# Patient Record
Sex: MALE | Race: WHITE | NOT HISPANIC OR LATINO | Employment: OTHER | URBAN - METROPOLITAN AREA
[De-identification: names, ages, dates, MRNs, and addresses within clinical notes are randomized per-mention and may not be internally consistent; named-entity substitution may affect disease eponyms.]

---

## 2017-06-19 PROBLEM — M54.50 LUMBAGO: Status: ACTIVE | Noted: 2017-06-19

## 2017-08-24 ENCOUNTER — OFFICE VISIT (OUTPATIENT)
Dept: SURGERY | Facility: CLINIC | Age: 37
End: 2017-08-24
Payer: MEDICARE

## 2017-08-24 VITALS
HEART RATE: 76 BPM | WEIGHT: 167.13 LBS | SYSTOLIC BLOOD PRESSURE: 131 MMHG | BODY MASS INDEX: 27.81 KG/M2 | DIASTOLIC BLOOD PRESSURE: 87 MMHG | TEMPERATURE: 98 F

## 2017-08-24 DIAGNOSIS — K64.1 GRADE II HEMORRHOIDS: Primary | ICD-10-CM

## 2017-08-24 PROCEDURE — 3008F BODY MASS INDEX DOCD: CPT | Mod: S$GLB,,, | Performed by: SURGERY

## 2017-08-24 PROCEDURE — 99999 PR PBB SHADOW E&M-NEW PATIENT-LVL III: CPT | Mod: PBBFAC,,, | Performed by: SURGERY

## 2017-08-24 PROCEDURE — 99203 OFFICE O/P NEW LOW 30 MIN: CPT | Mod: S$GLB,,, | Performed by: SURGERY

## 2017-08-24 RX ORDER — OMEPRAZOLE 20 MG/1
CAPSULE, DELAYED RELEASE ORAL
COMMUNITY
Start: 2017-08-21 | End: 2019-03-04 | Stop reason: ALTCHOICE

## 2017-08-24 NOTE — PROGRESS NOTES
Subjective:       Patient ID: Darryn Wilson is a 37 y.o. male.    Chief Complaint: Consult (hemorrhiods)    HPI 37-year-old male complains of problems with intermittent hemorrhoids for little over a year.  He says he has internal and external hemorrhoids.  He has pain with most bowel movements.  He has occasional bleeding.  He says he takes pain medicine for this.  He does take a stool softener occasionally.  The bleeding is really minimal.    Review of Systems   Constitutional: Negative for chills, fatigue, fever and unexpected weight change.   HENT: Negative for congestion, sore throat, trouble swallowing and voice change.    Eyes: Negative for redness and visual disturbance.   Respiratory: Negative for cough, shortness of breath and wheezing.    Cardiovascular: Negative for chest pain and palpitations.   Gastrointestinal: Positive for anal bleeding and rectal pain. Negative for abdominal pain, blood in stool, nausea and vomiting.   Genitourinary: Negative for dysuria, frequency, hematuria and urgency.   Musculoskeletal: Negative for arthralgias, myalgias and neck pain.   Skin: Negative for rash and wound.   Allergic/Immunologic: Negative.    Neurological: Negative for tremors, seizures, weakness and headaches.   Hematological: Does not bruise/bleed easily.   Psychiatric/Behavioral: Negative for confusion and dysphoric mood. The patient is not nervous/anxious.      Objective:     Physical Exam   Constitutional: He is oriented to person, place, and time. He appears well-developed and well-nourished. No distress.   HENT:   Head: Normocephalic and atraumatic.   Mouth/Throat: Oropharynx is clear and moist. No oropharyngeal exudate.   Eyes: Conjunctivae and EOM are normal. Pupils are equal, round, and reactive to light. No scleral icterus.   Neck: Normal range of motion. Neck supple. No thyromegaly present.   Cardiovascular: Normal rate, regular rhythm, normal heart sounds and intact distal pulses.    No murmur  heard.  Pulmonary/Chest: Effort normal and breath sounds normal. No respiratory distress. He has no wheezes. He has no rales.   Abdominal: Soft. Bowel sounds are normal. He exhibits no distension. There is no tenderness. No hernia.   Genitourinary:   Genitourinary Comments: He has some small external skin tags.  There are no thrombosed external hemorrhoids.  He has internal hemorrhoids.  There is no evidence of fissure.  There are no anal masses.  There is no bleeding currently.   Musculoskeletal: Normal range of motion. He exhibits no edema or tenderness.   Lymphadenopathy:     He has no cervical adenopathy.   Neurological: He is alert and oriented to person, place, and time. No cranial nerve deficit.   Skin: Skin is warm and dry. No rash noted. No erythema.   Psychiatric: He has a normal mood and affect. His behavior is normal. Judgment normal.     Assessment:     Encounter Diagnosis   Name Primary?    Grade II hemorrhoids Yes       Plan:      1.  Try stool softeners and Proctofoam and sitz baths.  2.  If this problem persists, we may proceed with a PPH hemorrhoidectomy.

## 2018-06-28 ENCOUNTER — TELEPHONE (OUTPATIENT)
Dept: ENDOCRINOLOGY | Facility: CLINIC | Age: 38
End: 2018-06-28

## 2018-07-30 ENCOUNTER — OFFICE VISIT (OUTPATIENT)
Dept: ENDOCRINOLOGY | Facility: CLINIC | Age: 38
End: 2018-07-30
Payer: MEDICARE

## 2018-07-30 ENCOUNTER — TELEPHONE (OUTPATIENT)
Dept: ENDOCRINOLOGY | Facility: CLINIC | Age: 38
End: 2018-07-30

## 2018-07-30 ENCOUNTER — LAB VISIT (OUTPATIENT)
Dept: LAB | Facility: HOSPITAL | Age: 38
End: 2018-07-30
Attending: PHYSICIAN ASSISTANT
Payer: MEDICARE

## 2018-07-30 VITALS
WEIGHT: 171.5 LBS | HEIGHT: 65 IN | DIASTOLIC BLOOD PRESSURE: 84 MMHG | SYSTOLIC BLOOD PRESSURE: 126 MMHG | HEART RATE: 69 BPM | BODY MASS INDEX: 28.57 KG/M2

## 2018-07-30 DIAGNOSIS — E29.1 HYPOGONADISM IN MALE: ICD-10-CM

## 2018-07-30 DIAGNOSIS — E29.1 HYPOGONADISM IN MALE: Primary | ICD-10-CM

## 2018-07-30 PROCEDURE — 36415 COLL VENOUS BLD VENIPUNCTURE: CPT | Mod: PO

## 2018-07-30 PROCEDURE — 84270 ASSAY OF SEX HORMONE GLOBUL: CPT

## 2018-07-30 PROCEDURE — 3008F BODY MASS INDEX DOCD: CPT | Mod: CPTII,S$GLB,, | Performed by: PHYSICIAN ASSISTANT

## 2018-07-30 PROCEDURE — 99203 OFFICE O/P NEW LOW 30 MIN: CPT | Mod: S$GLB,,, | Performed by: PHYSICIAN ASSISTANT

## 2018-07-30 PROCEDURE — 99999 PR PBB SHADOW E&M-EST. PATIENT-LVL III: CPT | Mod: PBBFAC,,, | Performed by: PHYSICIAN ASSISTANT

## 2018-07-30 RX ORDER — MORPHINE SULFATE 200 MG/1
200 TABLET, FILM COATED, EXTENDED RELEASE ORAL 2 TIMES DAILY
COMMUNITY

## 2018-07-30 NOTE — LETTER
July 30, 2018      Maxim Storm III, MD  [address]  [place] [address]           [place] - Endo/Diabetes  [address]  [place] [address]  Phone: [phone]          Patient: Darryn Wilson   MR Number: [id number]   YOB: 1980   Date of Visit: 7/30/2018       Dear Dr. Maxim Storm III:    Thank you for referring Darryn Wilson to me for evaluation. Attached you will find relevant portions of my assessment and plan of care.    If you have questions, please do not hesitate to call me. I look forward to following Darryn Wilson along with you.    Sincerely,    CHAD Garcia PA-C    Enclosure  CC:  No Recipients    If you would like to receive this communication electronically, please contact [email or web address] or [phone] to request more information on [employer] Link access.    For providers and/or their staff who would like to refer a patient to [facility], please contact us through our one-stop-shop provider referral line, [facility], at [phone].    If you feel you have received this communication in error or would no longer like to receive these types of communications, please e-mail [email or web address]

## 2018-07-30 NOTE — TELEPHONE ENCOUNTER
Mr. Wilson needs to do a semen anaylsis with his labs in 2 months and have an appointment with Dr. Verdin in 3 months.

## 2018-08-01 ENCOUNTER — TELEPHONE (OUTPATIENT)
Dept: FAMILY MEDICINE | Facility: CLINIC | Age: 38
End: 2018-08-01

## 2018-08-01 NOTE — TELEPHONE ENCOUNTER
Attempted to call Pt regarding Semen Analysis along with Labs in 2 month also to confirm his F/u appt with Dr. Verdin, no answer left call back message

## 2018-08-01 NOTE — TELEPHONE ENCOUNTER
Called patient and left a message to call back to the clinic regarding instructions from Jose PERRY.

## 2018-08-01 NOTE — TELEPHONE ENCOUNTER
Attempted to call Pt regarding Semen Analysis along with Labs in 2 month also to confirm his F/u appt with Dr. Verdin

## 2018-08-02 ENCOUNTER — TELEPHONE (OUTPATIENT)
Dept: FAMILY MEDICINE | Facility: CLINIC | Age: 38
End: 2018-08-02

## 2018-08-03 LAB
ALBUMIN SERPL-MCNC: 4.3 G/DL (ref 3.6–5.1)
SHBG SERPL-SCNC: 71 NMOL/L (ref 10–50)
TESTOST FREE SERPL-MCNC: 53.9 PG/ML (ref 46–224)
TESTOST SERPL-MCNC: 751 NG/DL (ref 250–1100)
TESTOSTERONE.FREE+WB SERPL-MCNC: 106.1 NG/DL (ref 110–575)

## 2018-08-07 ENCOUNTER — TELEPHONE (OUTPATIENT)
Dept: ENDOCRINOLOGY | Facility: CLINIC | Age: 38
End: 2018-08-07

## 2018-08-07 NOTE — TELEPHONE ENCOUNTER
Attempted to call Pt in regards instructions from Precious Garcia, no answer left call back message

## 2018-09-26 ENCOUNTER — LAB VISIT (OUTPATIENT)
Dept: LAB | Facility: HOSPITAL | Age: 38
End: 2018-09-26
Attending: PHYSICIAN ASSISTANT
Payer: MEDICARE

## 2018-09-26 DIAGNOSIS — E29.1 HYPOGONADISM IN MALE: ICD-10-CM

## 2018-09-26 LAB
ALBUMIN SERPL BCP-MCNC: 4.2 G/DL
ALP SERPL-CCNC: 44 U/L
ALT SERPL W/O P-5'-P-CCNC: 13 U/L
ANION GAP SERPL CALC-SCNC: 7 MMOL/L
AST SERPL-CCNC: 17 U/L
BASOPHILS # BLD AUTO: 0.06 K/UL
BASOPHILS NFR BLD: 1.3 %
BILIRUB SERPL-MCNC: 0.3 MG/DL
BUN SERPL-MCNC: 9 MG/DL
CALCIUM SERPL-MCNC: 9.4 MG/DL
CHLORIDE SERPL-SCNC: 103 MMOL/L
CO2 SERPL-SCNC: 31 MMOL/L
CREAT SERPL-MCNC: 0.9 MG/DL
DIFFERENTIAL METHOD: ABNORMAL
EOSINOPHIL # BLD AUTO: 0.1 K/UL
EOSINOPHIL NFR BLD: 2.7 %
ERYTHROCYTE [DISTWIDTH] IN BLOOD BY AUTOMATED COUNT: 11.8 %
EST. GFR  (AFRICAN AMERICAN): >60 ML/MIN/1.73 M^2
EST. GFR  (NON AFRICAN AMERICAN): >60 ML/MIN/1.73 M^2
FSH SERPL-ACNC: 1.5 MIU/ML
GLUCOSE SERPL-MCNC: 95 MG/DL
HCT VFR BLD AUTO: 38 %
HGB BLD-MCNC: 13 G/DL
IMM GRANULOCYTES # BLD AUTO: 0 K/UL
IMM GRANULOCYTES NFR BLD AUTO: 0 %
LH SERPL-ACNC: 1.7 MIU/ML
LYMPHOCYTES # BLD AUTO: 2.6 K/UL
LYMPHOCYTES NFR BLD: 55.6 %
MCH RBC QN AUTO: 28.4 PG
MCHC RBC AUTO-ENTMCNC: 34.2 G/DL
MCV RBC AUTO: 83 FL
MONOCYTES # BLD AUTO: 0.3 K/UL
MONOCYTES NFR BLD: 5.9 %
NEUTROPHILS # BLD AUTO: 1.6 K/UL
NEUTROPHILS NFR BLD: 34.5 %
NRBC BLD-RTO: 0 /100 WBC
PLATELET # BLD AUTO: 268 K/UL
PMV BLD AUTO: 9.7 FL
POTASSIUM SERPL-SCNC: 4.2 MMOL/L
PROLACTIN SERPL IA-MCNC: 7.7 NG/ML
PROT SERPL-MCNC: 6.6 G/DL
RBC # BLD AUTO: 4.57 M/UL
SODIUM SERPL-SCNC: 141 MMOL/L
WBC # BLD AUTO: 4.75 K/UL

## 2018-09-26 PROCEDURE — 80053 COMPREHEN METABOLIC PANEL: CPT

## 2018-09-26 PROCEDURE — 83001 ASSAY OF GONADOTROPIN (FSH): CPT

## 2018-09-26 PROCEDURE — 83002 ASSAY OF GONADOTROPIN (LH): CPT

## 2018-09-26 PROCEDURE — 84146 ASSAY OF PROLACTIN: CPT

## 2018-09-26 PROCEDURE — 36415 COLL VENOUS BLD VENIPUNCTURE: CPT | Mod: PO

## 2018-09-26 PROCEDURE — 85025 COMPLETE CBC W/AUTO DIFF WBC: CPT

## 2018-09-26 PROCEDURE — 84270 ASSAY OF SEX HORMONE GLOBUL: CPT

## 2018-09-29 LAB
ALBUMIN SERPL-MCNC: 4.5 G/DL (ref 3.6–5.1)
SHBG SERPL-SCNC: 116 NMOL/L (ref 10–50)
TESTOST FREE SERPL-MCNC: 9.3 PG/ML (ref 46–224)
TESTOST SERPL-MCNC: 229 NG/DL (ref 250–1100)
TESTOSTERONE.FREE+WB SERPL-MCNC: 19.2 NG/DL (ref 110–575)

## 2018-10-01 ENCOUNTER — OFFICE VISIT (OUTPATIENT)
Dept: ENDOCRINOLOGY | Facility: CLINIC | Age: 38
End: 2018-10-01
Payer: MEDICARE

## 2018-10-01 VITALS
SYSTOLIC BLOOD PRESSURE: 113 MMHG | RESPIRATION RATE: 16 BRPM | HEART RATE: 53 BPM | BODY MASS INDEX: 26.38 KG/M2 | HEIGHT: 65 IN | TEMPERATURE: 98 F | DIASTOLIC BLOOD PRESSURE: 73 MMHG | WEIGHT: 158.31 LBS

## 2018-10-01 DIAGNOSIS — E29.1 HYPOGONADISM IN MALE: Primary | ICD-10-CM

## 2018-10-01 PROCEDURE — 3008F BODY MASS INDEX DOCD: CPT | Mod: CPTII,,, | Performed by: PHYSICIAN ASSISTANT

## 2018-10-01 PROCEDURE — 99999 PR PBB SHADOW E&M-EST. PATIENT-LVL IV: CPT | Mod: PBBFAC,,, | Performed by: PHYSICIAN ASSISTANT

## 2018-10-01 PROCEDURE — 99214 OFFICE O/P EST MOD 30 MIN: CPT | Mod: S$PBB,,, | Performed by: PHYSICIAN ASSISTANT

## 2018-10-01 PROCEDURE — 99214 OFFICE O/P EST MOD 30 MIN: CPT | Mod: PBBFAC,PO | Performed by: PHYSICIAN ASSISTANT

## 2018-10-01 NOTE — PROGRESS NOTES
"CC: Hypogonadism    HPI: Darryn Wilson is a 38 y.o. male here for hypogonadism along with other conditions listed in the Visit Diagnosis. Diagnosed ~ 4 years ago after being on pain medication for a back injury. He is using 200 mg q 4 weeks of testosterone cypionate. He is receiving his testosterone from pain management. He has not had an MRI or scrotal u/s. +FHx of DM in his paternal aunt and grandmother. No fhx of thyroid disease. His last injection was July 20th. No hx of brain or testicular injury. No history of body building supplements. He does take pain medication regularly for a back injury.     He is trying to conceive. His wife has been checked by her GYN. He does have one 14 year old daughter. He did take clomid in the past and stopped when his insurance would not cover the medication. Sex drive is low, no morning erections. Body hair has decreased. Fatigue. He has not yet had an MRI or sperm analysis. Reports having sperm analysis about two years ago when he was on testosterone and reportedly this was low. Last testosterone was 229, free (9.3), Bioavaliable (19.2), this was ~8 weeks off testosterone injections.    PMHx, PSHx: reviewed in epic.    Social Hx: no ETOH/tobacco use. He did smoke from the age of [age] yo and smoked 1 pack per day.    ROS:   Constitutional: fatigue, wt loss.   Eyes: No recent visual changes  Cardiovascular: Denies current anginal symptoms  Respiratory: Denies current respiratory difficulty  Gastrointestinal: Denies recent bowel disturbances  GenitoUrinary - No dysuria  Skin: No new skin rash  Neurologic: No focal neurologic complaints  Musc: no muscle aches or joint pain  Remainder ROS negative     /73 (BP Location: Left arm, Patient Position: Sitting, BP Method: Large (Automatic))   Pulse (!) 53   Temp 97.9 °F (36.6 °C) (Oral)   Resp 16   Ht 5' 5" (1.651 m)   Wt 71.8 kg (158 lb 4.6 oz)   BMI 26.34 kg/m²      Personally reviewed labs below:    Lab Visit on " 09/26/2018   Component Date Value Ref Range Status    Testosterone 09/26/2018 229* 250 - 1100 ng/dL Final    Testosterone, Free 09/26/2018 9.3* 46.0 - 224.0 pg/mL Final    Testosterone, Bioavailable 09/26/2018 19.2* 110.0 - 575.0 ng/dL Final    Sex Hormone Binding Globulin 09/26/2018 116* 10 - 50 nmol/L Final    Albumin 09/26/2018 4.5  3.6 - 5.1 g/dL Final    Comment: **Data from J Clin Invest 1974:53:819-828 and J Clin Endocrinol   Metab 1973 36:7937-4042. Men with clinically significant   hypogonadal symptoms and testosterone values repeatedly in the   range of the 200-300 ng/dL or less, may benefit from testosterone  treatment after adequate risk and benefits counseling.  For additional information, please refer to   http://education.[employer]/faq/ELU153 (This link is   being provided for informational/ educational purposes only.)  This test was developed and its analytical performance   characteristics have been determined by [facility]. It has not been cleared or approved by the [place]  Food and Drug Administration. This assay has been validated   pursuant to the CLIA regulations and is used for clinical   purposes.  @ Test Performed By:  [facility]  Bassam Arredondo M.D., Ph.D.,   [address] [place] [id number]  CLIA  [id number]      WBC 09/26/2018 4.75  3.90 - 12.70 K/uL Final    RBC 09/26/2018 4.57* 4.60 - 6.20 M/uL Final    Hemoglobin 09/26/2018 13.0* 14.0 - 18.0 g/dL Final    Hematocrit 09/26/2018 38.0* 40.0 - 54.0 % Final    MCV 09/26/2018 83  82 - 98 fL Final    MCH 09/26/2018 28.4  27.0 - 31.0 pg Final    MCHC 09/26/2018 34.2  32.0 - 36.0 g/dL Final    RDW 09/26/2018 11.8  11.5 - 14.5 % Final    Platelets 09/26/2018 268  150 - 350 K/uL Final    MPV 09/26/2018 9.7  9.2 - 12.9 fL Final    Immature Granulocytes 09/26/2018 0.0  0.0 - 0.5 % Final    Gran # (ANC) 09/26/2018 1.6* 1.8 - 7.7 K/uL  Final    Immature Grans (Abs) 09/26/2018 0.00  0.00 - 0.04 K/uL Final    Comment: Mild elevation in immature granulocytes is non specific and   can be seen in a variety of conditions including stress response,   acute inflammation, trauma and pregnancy. Correlation with other   laboratory and clinical findings is essential.      Lymph # 09/26/2018 2.6  1.0 - 4.8 K/uL Final    Mono # 09/26/2018 0.3  0.3 - 1.0 K/uL Final    Eos # 09/26/2018 0.1  0.0 - 0.5 K/uL Final    Baso # 09/26/2018 0.06  0.00 - 0.20 K/uL Final    nRBC 09/26/2018 0  0 /100 WBC Final    Gran% 09/26/2018 34.5* 38.0 - 73.0 % Final    Lymph% 09/26/2018 55.6* 18.0 - 48.0 % Final    Mono% 09/26/2018 5.9  4.0 - 15.0 % Final    Eosinophil% 09/26/2018 2.7  0.0 - 8.0 % Final    Basophil% 09/26/2018 1.3  0.0 - 1.9 % Final    Differential Method 09/26/2018 Automated   Final    Sodium 09/26/2018 141  136 - 145 mmol/L Final    Potassium 09/26/2018 4.2  3.5 - 5.1 mmol/L Final    Chloride 09/26/2018 103  95 - 110 mmol/L Final    CO2 09/26/2018 31* 23 - 29 mmol/L Final    Glucose 09/26/2018 95  70 - 110 mg/dL Final    BUN, Bld 09/26/2018 9  6 - 20 mg/dL Final    Creatinine 09/26/2018 0.9  0.5 - 1.4 mg/dL Final    Calcium 09/26/2018 9.4  8.7 - 10.5 mg/dL Final    Total Protein 09/26/2018 6.6  6.0 - 8.4 g/dL Final    Albumin 09/26/2018 4.2  3.5 - 5.2 g/dL Final    Total Bilirubin 09/26/2018 0.3  0.1 - 1.0 mg/dL Final    Comment: For infants and newborns, interpretation of results should be based  on gestational age, weight and in agreement with clinical  observations.  Premature Infant recommended reference ranges:  Up to 24 hours.............<8.0 mg/dL  Up to 48 hours............<12.0 mg/dL  3-5 days..................<15.0 mg/dL  6-29 days.................<15.0 mg/dL      Alkaline Phosphatase 09/26/2018 44* 55 - 135 U/L Final    AST 09/26/2018 17  10 - 40 U/L Final    ALT 09/26/2018 13  10 - 44 U/L Final    Anion Gap 09/26/2018 7* 8 - 16  mmol/L Final    eGFR if African American 09/26/2018 >60.0  >60 mL/min/1.73 m^2 Final    eGFR if non African American 09/26/2018 >60.0  >60 mL/min/1.73 m^2 Final    Comment: Calculation used to obtain the estimated glomerular filtration  rate (eGFR) is the CKD-EPI equation.       FSH 09/26/2018 1.50  0.95 - 11.95 mIU/mL Final    Comment: Female Reference Ranges:  Follicular Phase.................3.03-8.08 mIU/mL  Midcycle Peak....................2.55-16.69 mIU/mL  Luteal Phase.....................1.38-5.47 mIU/mL  Postmenopausal...................26..41 mIU/mL  Male Reference Range:............0.95-11.95 mIU/mL      LH 09/26/2018 1.7  0.6 - 12.1 mIU/mL Final    Comment: Female Reference Ranges:  Follicular phase.............1.8-11.8 mIU/mL  Midcycle phase...............7.6-89.1 mIU/mL  Luteal phase.................0.6-14.0 mIU/mL  Post-menopausal without HRT..5.2-62.0 mIU/mL  Male Reference Interval......0.6-12.1 mIU/mL      Prolactin 09/26/2018 7.7  3.5 - 19.4 ng/mL Final      Prior labs under media tab from April.  No results found for: TSH, R1UXQGJ, R4MFOKB, THYROIDAB, FREET4      No results found for: HGBA1C     Previous exam 7/18  PE:  GENERAL: young male, well developed, well nourished  NECK: Supple neck, normal thyroid. No bruit. No AN.  LYMPHATIC: No cervical or supraclavicular lymphadenopathy  CARDIOVASCULAR: Normal heart sounds, no pedal edema  RESPIRATORY: Normal effort, CTAB.  ABDOMEN: soft, non-tender, non-distended.  : normal penis. Right testicle is larger than the left. No masses.  FEET: appropriate footwear.   PSYCH: normal mood and affect    Assessment/Plan:   1. Hypogonadism in male  MRI Brain W WO Contrast     Hypogonadism-MRI and sperm analysis.  Plan to start clomid 50 mg following sperm analysis. Pt will have to see urology if he does not have any sperm.    F/u 2 mths

## 2018-12-14 ENCOUNTER — LAB VISIT (OUTPATIENT)
Dept: LAB | Facility: HOSPITAL | Age: 38
End: 2018-12-14
Attending: INTERNAL MEDICINE
Payer: MEDICARE

## 2018-12-14 ENCOUNTER — OFFICE VISIT (OUTPATIENT)
Dept: ENDOCRINOLOGY | Facility: CLINIC | Age: 38
End: 2018-12-14
Payer: MEDICARE

## 2018-12-14 VITALS
HEART RATE: 59 BPM | RESPIRATION RATE: 16 BRPM | DIASTOLIC BLOOD PRESSURE: 71 MMHG | SYSTOLIC BLOOD PRESSURE: 112 MMHG | BODY MASS INDEX: 27.58 KG/M2 | TEMPERATURE: 99 F | WEIGHT: 165.56 LBS | HEIGHT: 65 IN

## 2018-12-14 DIAGNOSIS — E29.1 HYPOGONADISM IN MALE: ICD-10-CM

## 2018-12-14 DIAGNOSIS — I10 ESSENTIAL HYPERTENSION: ICD-10-CM

## 2018-12-14 DIAGNOSIS — N40.0 BENIGN PROSTATIC HYPERPLASIA, UNSPECIFIED WHETHER LOWER URINARY TRACT SYMPTOMS PRESENT: ICD-10-CM

## 2018-12-14 DIAGNOSIS — E29.1 HYPOGONADISM IN MALE: Primary | ICD-10-CM

## 2018-12-14 DIAGNOSIS — Z31.69 SUBFERTILITY OF COUPLE: Primary | ICD-10-CM

## 2018-12-14 DIAGNOSIS — F32.A DEPRESSION, UNSPECIFIED DEPRESSION TYPE: ICD-10-CM

## 2018-12-14 DIAGNOSIS — Z31.69 SUBFERTILITY OF COUPLE: ICD-10-CM

## 2018-12-14 DIAGNOSIS — R68.82 REDUCED LIBIDO: ICD-10-CM

## 2018-12-14 DIAGNOSIS — K21.9 GASTROESOPHAGEAL REFLUX DISEASE WITHOUT ESOPHAGITIS: ICD-10-CM

## 2018-12-14 LAB
BASOPHILS # BLD AUTO: 0.07 K/UL
BASOPHILS NFR BLD: 1.2 %
DIFFERENTIAL METHOD: ABNORMAL
EOSINOPHIL # BLD AUTO: 0.1 K/UL
EOSINOPHIL NFR BLD: 2.2 %
ERYTHROCYTE [DISTWIDTH] IN BLOOD BY AUTOMATED COUNT: 11.8 %
ESTRADIOL SERPL-MCNC: 26 PG/ML
FSH SERPL-ACNC: <0.1 MIU/ML
HCT VFR BLD AUTO: 39 %
HGB BLD-MCNC: 13 G/DL
IMM GRANULOCYTES # BLD AUTO: 0.01 K/UL
IMM GRANULOCYTES NFR BLD AUTO: 0.2 %
LH SERPL-ACNC: <0.1 MIU/ML
LYMPHOCYTES # BLD AUTO: 2.8 K/UL
LYMPHOCYTES NFR BLD: 48 %
MCH RBC QN AUTO: 28.3 PG
MCHC RBC AUTO-ENTMCNC: 33.3 G/DL
MCV RBC AUTO: 85 FL
MONOCYTES # BLD AUTO: 0.4 K/UL
MONOCYTES NFR BLD: 7 %
NEUTROPHILS # BLD AUTO: 2.4 K/UL
NEUTROPHILS NFR BLD: 41.4 %
NRBC BLD-RTO: 0 /100 WBC
PLATELET # BLD AUTO: 288 K/UL
PMV BLD AUTO: 9.6 FL
PROSTATE SPECIFIC ANTIGEN, TOTAL: 0.29 NG/ML
PSA FREE MFR SERPL: 79.31 %
PSA FREE SERPL-MCNC: 0.23 NG/ML
RBC # BLD AUTO: 4.6 M/UL
WBC # BLD AUTO: 5.87 K/UL

## 2018-12-14 PROCEDURE — 99999 PR PBB SHADOW E&M-EST. PATIENT-LVL IV: CPT | Mod: PBBFAC,,, | Performed by: INTERNAL MEDICINE

## 2018-12-14 PROCEDURE — 80327 ANABOLIC STEROID 1 OR 2: CPT

## 2018-12-14 PROCEDURE — 3008F BODY MASS INDEX DOCD: CPT | Mod: CPTII,S$GLB,, | Performed by: INTERNAL MEDICINE

## 2018-12-14 PROCEDURE — 84154 ASSAY OF PSA FREE: CPT

## 2018-12-14 PROCEDURE — 82672 ASSAY OF ESTROGEN: CPT

## 2018-12-14 PROCEDURE — 83002 ASSAY OF GONADOTROPIN (LH): CPT

## 2018-12-14 PROCEDURE — 85025 COMPLETE CBC W/AUTO DIFF WBC: CPT

## 2018-12-14 PROCEDURE — 36415 COLL VENOUS BLD VENIPUNCTURE: CPT | Mod: PO

## 2018-12-14 PROCEDURE — 83001 ASSAY OF GONADOTROPIN (FSH): CPT

## 2018-12-14 PROCEDURE — 99214 OFFICE O/P EST MOD 30 MIN: CPT | Mod: S$GLB,,, | Performed by: INTERNAL MEDICINE

## 2018-12-14 PROCEDURE — 82670 ASSAY OF TOTAL ESTRADIOL: CPT

## 2018-12-14 PROCEDURE — 84270 ASSAY OF SEX HORMONE GLOBUL: CPT

## 2018-12-14 RX ORDER — CLOMIPHENE CITRATE 50 MG/1
50 TABLET ORAL DAILY
Qty: 90 TABLET | Refills: 3 | Status: SHIPPED | OUTPATIENT
Start: 2018-12-14 | End: 2019-12-02

## 2018-12-14 RX ORDER — DEXTROAMPHETAMINE SACCHARATE, AMPHETAMINE ASPARTATE MONOHYDRATE, DEXTROAMPHETAMINE SULFATE AND AMPHETAMINE SULFATE 5; 5; 5; 5 MG/1; MG/1; MG/1; MG/1
1 CAPSULE, EXTENDED RELEASE ORAL
COMMUNITY
End: 2019-03-04 | Stop reason: ALTCHOICE

## 2018-12-14 RX ORDER — ONDANSETRON 4 MG/1
TABLET, FILM COATED ORAL
COMMUNITY
Start: 2018-11-29

## 2018-12-14 RX ORDER — TESTOSTERONE CYPIONATE 200 MG/ML
1 INJECTION, SOLUTION INTRAMUSCULAR
COMMUNITY
Start: 2018-12-05 | End: 2019-09-12

## 2018-12-14 NOTE — LETTER
December 14, 2018      Darryn Liu NP  [id number]  [facility]  [place] [address]           [place] - Endo/Diabetes  [address]   [place] [address]  Phone: [phone]          Patient: Darryn Wilson   MR Number: [id number]   YOB: 1980   Date of Visit: 12/14/2018       Dear :    Thank you for referring Darryn Wilson to me for evaluation. Attached you will find relevant portions of my assessment and plan of care.    If you have questions, please do not hesitate to call me. I look forward to following Darryn Wilson along with you.    Sincerely,    Kenyon Verdin MD    Enclosure  CC:  No Recipients    If you would like to receive this communication electronically, please contact [email or web address] or [phone] to request more information on [employer] Link access.    For providers and/or their staff who would like to refer a patient to [facility], please contact us through our one-stop-shop provider referral line, [facility], at [phone].    If you feel you have received this communication in error or would no longer like to receive these types of communications, please e-mail [email or web address]

## 2018-12-14 NOTE — PROGRESS NOTES
Subjective:      Patient ID: Darryn Wilson is a 38 y.o. male.    Chief Complaint:     38 yr old gentleman with hypogonadism and subefertility seen for initial care visit today.    History of Present Illness    The patient  is a 38 y.o. gentleman seen for initial care visit today with me on account of hypogonadism.  Patient had previously been seen by Precious Garcia PA-C..  His associated comorbidities are as detailed below;  Patient Active Problem List   Diagnosis    Lumbago    Hypogonadism in male    Essential hypertension    Gastroesophageal reflux disease without esophagitis    Depression    Reduced libido    Subfertility of couple        He was diagnosed ~ 4 years ago after being on pain medication for a back injury. He is currently using 200 mg q monthly  of testosterone cypionate.   Patient has not yet commenced clomiphene. He is receiving his testosterone from pain management. He has not had an MRI or scrotal u/s. +FHx of DM in his paternal aunt and grandmother. No fhx of thyroid disease. His last injection was July 20th. No hx of brain or testicular injury. No history of body building supplements. He does take pain medication regularly for a back injury. Patient has had multiple back surgeries on this account.  Patient had a back injury during work when he worked for [employer] and [employer]. He is presently on disability as a result.  Patients baseline [place] score is 9. He has not had a prior sleep study done.     He is presently trying to conceive. His wife has been checked by her GYN. He does have one 14 year old daughter. He did take clomid in the past and stopped when his insurance would not cover the medication. Sex drive is low, no morning erections. Body hair has decreased. Fatigue. He has not yet had an MRI or sperm analysis. Reports having sperm analysis about two years ago when he was on testosterone and reportedly this was low. Last testosterone was 229, free (9.3), Bioavaliable (19.2), this  "was ~8 weeks off testosterone injections.  Patient received his last injection ~ 1 week ago.        Review of Systems   Constitutional: Positive for fatigue (chronic). Negative for fever.   HENT: Negative for facial swelling, trouble swallowing and voice change.    Eyes: Negative for visual disturbance.   Respiratory: Negative for cough and shortness of breath.    Cardiovascular: Negative for chest pain, palpitations and leg swelling.   Gastrointestinal: Negative for nausea and vomiting.   Endocrine: Negative for polyuria.   Genitourinary: Negative for dysuria and frequency.   Musculoskeletal: Positive for back pain (chronic but stable), gait problem (spastic shuffling gaite) and neck stiffness (chronic). Negative for myalgias.   Skin: Negative for color change, pallor and rash.   Neurological: Positive for numbness (intermittently in both lower limbs). Negative for tremors, seizures and headaches.   Hematological: Does not bruise/bleed easily.   Psychiatric/Behavioral: Negative for decreased concentration and sleep disturbance.       Objective:   /71 (BP Location: Right arm, Patient Position: Sitting, BP Method: Medium (Automatic))   Pulse (!) 59   Temp 98.5 °F (36.9 °C) (Oral)   Resp 16   Ht 5' 5" (1.651 m)   Wt 75.1 kg (165 lb 9.1 oz)   BMI 27.55 kg/m² neck circ; 14" waist circ; 34.5" hip circ; 36" waist to hip ratio; 0.96       Physical Exam   Constitutional: He is oriented to person, place, and time. He appears well-developed and well-nourished. No distress.   Pleasant young man. Not pale, anicteric and afebrile. Well hydrated. Not in any acute distress.     HENT:   Head: Normocephalic and atraumatic.   Mouth/Throat: No oropharyngeal exudate.   Eyes: Conjunctivae and EOM are normal. Pupils are equal, round, and reactive to light. No scleral icterus.   Neck: Normal range of motion. Neck supple. No JVD present. No tracheal deviation present. No thyromegaly present.   Cardiovascular: Normal rate, " regular rhythm and normal heart sounds.   No murmur heard.  Pulmonary/Chest: Effort normal and breath sounds normal. No stridor. No respiratory distress. He has no wheezes.   Abdominal: Soft. He exhibits no distension. There is no tenderness.   Musculoskeletal: Normal range of motion. He exhibits no edema or tenderness.   Patient has a spastic gaite.  Loss of natural lumbar lordosis.   Lymphadenopathy:     He has no cervical adenopathy.   Neurological: He is alert and oriented to person, place, and time. No cranial nerve deficit. He exhibits abnormal muscle tone (generalized hypertonicity with lower limb hypereflexia).   Skin: Skin is warm and dry. No rash noted. He is not diaphoretic. No erythema. No pallor.   Psychiatric: He has a normal mood and affect. His behavior is normal. Judgment and thought content normal.   Vitals reviewed.      Lab Review:     Results for JAMES DIAZ (MRN [id number]) as of 12/14/2018 09:00   Ref. Range 7/30/2018 11:35 9/26/2018 11:30   WBC Latest Ref Range: 3.90 - 12.70 K/uL  4.75   RBC Latest Ref Range: 4.60 - 6.20 M/uL  4.57 (L)   Hemoglobin Latest Ref Range: 14.0 - 18.0 g/dL  13.0 (L)   Hematocrit Latest Ref Range: 40.0 - 54.0 %  38.0 (L)   MCV Latest Ref Range: 82 - 98 fL  83   MCH Latest Ref Range: 27.0 - 31.0 pg  28.4   MCHC Latest Ref Range: 32.0 - 36.0 g/dL  34.2   RDW Latest Ref Range: 11.5 - 14.5 %  11.8   Platelets Latest Ref Range: 150 - 350 K/uL  268   MPV Latest Ref Range: 9.2 - 12.9 fL  9.7   Gran% Latest Ref Range: 38.0 - 73.0 %  34.5 (L)   Gran # (ANC) Latest Ref Range: 1.8 - 7.7 K/uL  1.6 (L)   Immature Granulocytes Latest Ref Range: 0.0 - 0.5 %  0.0   Immature Grans (Abs) Latest Ref Range: 0.00 - 0.04 K/uL  0.00   Lymph% Latest Ref Range: 18.0 - 48.0 %  55.6 (H)   Lymph # Latest Ref Range: 1.0 - 4.8 K/uL  2.6   Mono% Latest Ref Range: 4.0 - 15.0 %  5.9   Mono # Latest Ref Range: 0.3 - 1.0 K/uL  0.3   Eosinophil% Latest Ref Range: 0.0 - 8.0 %  2.7   Eos # Latest  Ref Range: 0.0 - 0.5 K/uL  0.1   Basophil% Latest Ref Range: 0.0 - 1.9 %  1.3   Baso # Latest Ref Range: 0.00 - 0.20 K/uL  0.06   nRBC Latest Ref Range: 0 /100 WBC  0   Differential Method Unknown  Automated   Sodium Latest Ref Range: 136 - 145 mmol/L  141   Potassium Latest Ref Range: 3.5 - 5.1 mmol/L  4.2   Chloride Latest Ref Range: 95 - 110 mmol/L  103   CO2 Latest Ref Range: 23 - 29 mmol/L  31 (H)   Anion Gap Latest Ref Range: 8 - 16 mmol/L  7 (L)   BUN, Bld Latest Ref Range: 6 - 20 mg/dL  9   Creatinine Latest Ref Range: 0.5 - 1.4 mg/dL  0.9   eGFR if non African American Latest Ref Range: >60 mL/min/1.73 m^2  >60.0   eGFR if African American Latest Ref Range: >60 mL/min/1.73 m^2  >60.0   Glucose Latest Ref Range: 70 - 110 mg/dL  95   Calcium Latest Ref Range: 8.7 - 10.5 mg/dL  9.4   Alkaline Phosphatase Latest Ref Range: 55 - 135 U/L  44 (L)   Total Protein Latest Ref Range: 6.0 - 8.4 g/dL  6.6   Albumin Latest Ref Range: 3.5 - 5.2 g/dL  4.2   Total Bilirubin Latest Ref Range: 0.1 - 1.0 mg/dL  0.3   AST Latest Ref Range: 10 - 40 U/L  17   ALT Latest Ref Range: 10 - 44 U/L  13   Albumin Latest Ref Range: 3.6 - 5.1 g/dL 4.3 4.5   FSH Latest Ref Range: 0.95 - 11.95 mIU/mL  1.50   LH Latest Ref Range: 0.6 - 12.1 mIU/mL  1.7   Prolactin Latest Ref Range: 3.5 - 19.4 ng/mL  7.7   Sex Hormone Binding Globulin Latest Ref Range: 10 - 50 nmol/L 71 (H) 116 (H)   Testosterone Latest Ref Range: 250 - 1100 ng/dL 751 229 (L)   Testosterone Testosterone Latest Ref Range: 110.0 - 575.0 ng/dL 106.1 (L) 19.2 (L)   Testosterone, Free Latest Ref Range: 46.0 - 224.0 pg/mL 53.9 9.3 (L)       Assessment:     1. Hypogonadism in male  Dihydrotestosterone    Follicle stimulating hormone    Luteinizing hormone    Testosterone Panel    Estradiol    Estrogens, total    PSA, total and free    CBC auto differential    Semen analysis   2. Essential hypertension     3. Gastroesophageal reflux disease without esophagitis     4. Depression,  unspecified depression type     5. Reduced libido     6. Subfertility of couple  Semen analysis   7. Benign prostatic hyperplasia, unspecified whether lower urinary tract symptoms present  PSA, total and free        1. Regarding hypogonadism; to obtain full testing profile as detailed above. It appears that the current hypogonadism is likely due to the opiates that he is on. To stop the testosterone immediately and commence clomiphene 50mg QD.  2. Regarding hypertension; BP is well controlled.  3. Regarding GERD; symptomatically stable. To continue PPI.      Plan:       FFup in ~ 3mths

## 2018-12-17 ENCOUNTER — TELEPHONE (OUTPATIENT)
Dept: ENDOCRINOLOGY | Facility: CLINIC | Age: 38
End: 2018-12-17

## 2018-12-17 DIAGNOSIS — E29.1 HYPOGONADISM IN MALE: Primary | ICD-10-CM

## 2018-12-17 NOTE — TELEPHONE ENCOUNTER
----- Message from Vidhya Martinez sent at 12/17/2018  1:59 PM CST -----  Patient is coming in on 12/20/18 for a MRI and requires LABS to be done prior to contrast, please put the STAT LAB orders in for creatinine serum and let me know and I will complete the appointment need for our patient.  Also the MRI edward Mikayla wanted to verify the orders and what you were looking for ,to see if right orders in , the brain or Pituitary study you can call her at [phone] and speak to her to clarify.  Thanking you in advance for you help with this important matter.    Vidhya Martinez    [phone]

## 2018-12-18 LAB
ANDROSTANOLONE SERPL-MCNC: 930 PG/ML (ref 112–955)
ESTROGEN SERPL-MCNC: 94 PG/ML

## 2018-12-19 LAB
ALBUMIN SERPL-MCNC: 4.2 G/DL (ref 3.6–5.1)
SHBG SERPL-SCNC: 103 NMOL/L (ref 10–50)
TESTOST FREE SERPL-MCNC: 57.3 PG/ML (ref 46–224)
TESTOST SERPL-MCNC: 1096 NG/DL (ref 250–1100)
TESTOSTERONE.FREE+WB SERPL-MCNC: 110.4 NG/DL (ref 110–575)

## 2019-01-17 ENCOUNTER — TELEPHONE (OUTPATIENT)
Dept: UROLOGY | Facility: CLINIC | Age: 39
End: 2019-01-17

## 2019-03-03 NOTE — PROGRESS NOTES
[facility] New Patient/H&P:    Darryn Wilson is a 38 y.o. male Referred by ALTHEA Dsouza for evaluation of BPH sxs/LUTS on TRT.    He has a history of a back injury and is followed by pain management, who started him on TRT, 200mg monthly.  He takes tizanidine, baclofen, MS contin. Managed by Dr Storm, who has also been Rxing his testosterone.  He does also have HTN on combination agent, and opioid induced chronic constipation on Linzess  At last evaluation 1/16/19 indicated he had discussed risk of worsening LUTS and prostate growth with his pain mgmt doc when starting TRT.  He reported difficulty initiating stream and emptying bladder, as well as occ mild dysuria. Improved after abx at visit in Oct 2018. Noted flomax didn't help him so he was no longer taking it.  Was noted to have eval by Dr Verdin for his hypgonadism and desire for fertility and was advised to switch to Clomid but hadnt started yet due to cost.    He last saw Dr Verdin on 12/14/18 noting he was diagnosed with hypogonadism ~4 years ago after being on pain medication for a back injury.   On disability from back injury at [facility] and has had multiple back injuries and has had multiple back surgeries. As well noted to have [place] score of 9 and no prior sleep study.  He is presently trying to conceive. He does have one 14 year old daughter. He did take clomid in the past and stopped when his insurance would not cover the medication. Sex drive is low, no morning erections. Body hair has decreased. Fatigue. He has not yet had an MRI or sperm analysis. Reports having sperm analysis about two years ago when he was on testosterone and reportedly this was low. Last testosterone was 229, free (9.3), Bioavaliable (19.2), this was ~8 weeks off testosterone injections. Patient received his last injection ~ 1 week ago. Repeat T that day 751 on 12/14/18. (H/H 13/39, psa 0.29 with 79% free psa)  His hypogonadism was attributed to chronic  opioid use and he was advised by Dr Verdin to stop the testosterone immediately and commence clomiphene 50mg QD.  He did not as per PCP visit above.    He presents today noting:  Had semenalysis done at [facility]  - reports low volume.  Didn't feel comfortable collecting there so not sure of effect on SA  Was having dysuria in oct and went away with abx but never completely.  Small amount of mild burning just when going to start urination - not constant - just as urine is starting to pass.   Feels like something stops and then starts going, and no pain during urination.  Sometimes urinates freely - sasha if well hydrated.  Thinks he had an STD, unsure of what it was, about last year and was treated for it. Urine was also cloudy. Had burning. Reports negative testing but improved after abx.  No perineal perirectal ejaculatory or testicular pain.  Flomax didn't change anything in the past.  AUA SS: 7/3 mixed (2: intermitt, sleeping; 1: emptying, freq, weak stream)  Occasional hesitancy occasionally okay.  If he is well hydrated has no problems urinating.  When he drinks a lot of water, no problems at all  + constipation - feels like has internal hemmorhoid stool is pushing past  6 stool softeners per day, 6 fiber per day, 1 500mg magnesium. Linzess when feeling backed up.       Past Medical History:   Diagnosis Date    Allergy     DDD (degenerative disc disease), lumbosacral     Dysphagia     GERD (gastroesophageal reflux disease)     Hesitancy of micturition     Hypertension     Hypogonadism in male     Uncomplicated opioid dependence        Past Surgical History:   Procedure Laterality Date    LUMBAR FUSION N/A     MYELOGRAM N/A 6/19/2017    Performed by Clyde [facility]zano [facility] at [facility] CATH    spinal cord stimulator and then removed         History reviewed. No pertinent family history.    Social History     Socioeconomic History    Marital status:      Spouse name: Not on file    Number of  "children: Not on file    Years of education: Not on file    Highest education level: Not on file   Social Needs    Financial resource strain: Not on file    Food insecurity - worry: Not on file    Food insecurity - inability: Not on file    Transportation needs - medical: Not on file    Transportation needs - non-medical: Not on file   Occupational History    Not on file   Tobacco Use    Smoking status: Former Smoker   Substance and Sexual Activity    Alcohol use: Yes    Drug use: Not on file    Sexual activity: Not on file   Other Topics Concern    Not on file   Social History Narrative    Not on file       Review of patient's allergies indicates:  No Known Allergies    Medications Reviewed: see MAR    ROS:    Constitutional: denies fevers, chills, night sweats, fatigue, malaise  Respiratory: negative for cough, shortness of breath, wheezing, dyspnea.  Cardiovascular: + for high blood pressure, negative for chest pain, varicose veins, ankle swelling, palpitations, syncope.  GI: negative for abdominal pain, heartburn, indigestion, nausea, vomiting, constipation, diarrhea, blood in stool.   Urology: as noted above in HPI  Endocrinology: negative for cold intolerance, excessive thirst, not feeling tired/sluggish, no heat intolerance.   Hematology/Lymph: negative for easy bleeding, easy bruising, swollen glands.  Musculoskeletal: negative for back pain, joint pain, joint swelling, neck pain.  Allergy-Immunology: negative for seasonal allergies, negative for unusual infections.   Skin: negative for boils, breast lumps, hives, itching, rash.   Neurology: negative for, dizziness, headache, tingling/numbness, tremors.   Psych: satisfied with life; negative for, anxiety, depression, suicidal thoughts.     PHYSICAL EXAM:    Vitals:    03/04/19 1117   BP: 137/88   Pulse: 73   Resp: 18     Body mass index is 27.51 kg/m². Weight: 75 kg (165 lb 5.5 oz) Height: 5' 5" (165.1 cm)       General: Alert, cooperative, no " distress, appears stated age  Head: Normocephalic, without obvious abnormality, atraumatic  Neck: no masses, no thyromegaly, no lymphadenopathy  Eyes: PERRL, conjunctiva/corneas clear  Lungs: Respirations unlabored, normal effort, no accessory muscle use  CV: Warm and well perfused extremities  Abdomen: Soft, non-tender, no CVA tenderness, no hepatosplenomegaly, no hernia  Penis: phallus normal, circumcised, well cared for, no plaques or lesions.   Scrotum: no cysts, no lesions, no rash, no hydrocele, mild L varicocele.   Epididymes: normal, nontender, symmetrical, no masses or cysts.   Testes: normal, both descended, no masses.   Urethra: palpably normal with orthotopic meatus of normal size    ARBEN: normal sphincter tone, no masses, no hemmorrhoids   PROSTATE: 35g, no nodules, non-tender, symmetrical.   Extremities: Extremities normal, atraumatic, no cyanosis or edema  Skin: Normal color, texture, and turgor, no rashes or lesions  Psych: Appropriate, well oriented, normal affect, normal mood  Neuro: Non-focal    LABS:    Recent Results (from the past 336 hour(s))   POCT URINE DIPSTICK WITHOUT MICROSCOPE    Collection Time: 03/04/19 11:47 AM   Result Value Ref Range    Color, UA yellow     Spec Grav UA 1.025     pH, UA 5.5     WBC, UA neg     Nitrite, UA neg     Protein neg     Glucose, UA neg     Ketones, UA trace     Urobilinogen, UA 0.2     Bilirubin neg     Blood, UA neg          Assessment/Diagnosis:    1. Lower urinary tract symptoms (LUTS)  POCT URINE DIPSTICK WITHOUT MICROSCOPE   2. Hypogonadism in male     3. Chronic constipation         Plans:  He does have mild obstructive lower urinary tract symptoms and an enlarged prostate for his age.  He does however note that when he is well hydrated he has no problems at all.  Sometimes he feels like there is a restriction in his flow and this may be prostatic or a urethral obstruction such as stricture, though is less often the problem as he notes he urinates  perfectly when he is well hydrated.  He also does have severe chronic constipation and we did discuss the affects on the lower urinary tract in voiding symptoms as well as contribution to obstructive symptoms, incomplete emptying, and urgency with distended bowel.  He takes a significant amount of stool softeners and daily bowel regimen elements and also has Linzess which he uses p.r.n..  We did discuss it may be better to use Linzess daily and add over-the-counter elements as needed, but he has issues with these recommendations.  We did discuss that it would probably be beneficial to see a gastroenterologist and I will cc his primary care to make referral.    He did finally stop testosterone replacement therapy and start Clomid and we did again discuss the risks and benefits of testosterone replacement therapy especially as it relates to fertility, and he has been continuing to inject testosterone despite desired fertility.  He did have a recent semen analysis at [facility] noting low volume, I do not have the results.  He was however only recently discontinued his testosterone and he should likely repeat the semen analysis a few months after the testosterone.  He is following up with endocrinology for management of his hypogonadism especially as it relates to desired fertility, and he should continue to do so and comply with the recommendations of Dr. Verdin.    For his LUTS, especially with enlarged prostate, Flomax may be beneficial or some other alpha-blocker, however we did discuss the side effect of retrograde ejaculation which could limit fertility potential in this young man desiring fertility.  He does report that when he is well hydrated he has no problems urinating and therefore will defer meds at this time and encourage increased hydration.    RTC 6 mos to see our nurse practitioner for office based assessment of obstructive voiding pattern with uroflow/PVR and reassessment of sxs with AUA SS  If  concern for obstruction at that time, can offer cystoscopy plus or minus transrectal ultrasound.

## 2019-03-04 ENCOUNTER — OFFICE VISIT (OUTPATIENT)
Dept: UROLOGY | Facility: CLINIC | Age: 39
End: 2019-03-04
Payer: MEDICARE

## 2019-03-04 VITALS
HEIGHT: 65 IN | WEIGHT: 165.38 LBS | BODY MASS INDEX: 27.56 KG/M2 | SYSTOLIC BLOOD PRESSURE: 137 MMHG | RESPIRATION RATE: 18 BRPM | HEART RATE: 73 BPM | DIASTOLIC BLOOD PRESSURE: 88 MMHG

## 2019-03-04 DIAGNOSIS — R39.9 LOWER URINARY TRACT SYMPTOMS (LUTS): Primary | ICD-10-CM

## 2019-03-04 DIAGNOSIS — E29.1 HYPOGONADISM IN MALE: ICD-10-CM

## 2019-03-04 DIAGNOSIS — K59.09 CHRONIC CONSTIPATION: ICD-10-CM

## 2019-03-04 LAB
BILIRUB SERPL-MCNC: ABNORMAL MG/DL
BLOOD URINE, POC: ABNORMAL
COLOR, POC UA: YELLOW
GLUCOSE UR QL STRIP: ABNORMAL
KETONES UR QL STRIP: ABNORMAL
LEUKOCYTE ESTERASE URINE, POC: ABNORMAL
NITRITE, POC UA: ABNORMAL
PH, POC UA: 5.5
PROTEIN, POC: ABNORMAL
SPECIFIC GRAVITY, POC UA: 1.02
UROBILINOGEN, POC UA: 0.2

## 2019-03-04 PROCEDURE — 81002 POCT URINE DIPSTICK WITHOUT MICROSCOPE: ICD-10-PCS | Mod: S$GLB,,, | Performed by: UROLOGY

## 2019-03-04 PROCEDURE — 3075F SYST BP GE 130 - 139MM HG: CPT | Mod: CPTII,S$GLB,, | Performed by: UROLOGY

## 2019-03-04 PROCEDURE — 81002 URINALYSIS NONAUTO W/O SCOPE: CPT | Mod: S$GLB,,, | Performed by: UROLOGY

## 2019-03-04 PROCEDURE — 99999 PR PBB SHADOW E&M-EST. PATIENT-LVL III: CPT | Mod: PBBFAC,,, | Performed by: UROLOGY

## 2019-03-04 PROCEDURE — 99999 PR PBB SHADOW E&M-EST. PATIENT-LVL III: ICD-10-PCS | Mod: PBBFAC,,, | Performed by: UROLOGY

## 2019-03-04 PROCEDURE — 99204 OFFICE O/P NEW MOD 45 MIN: CPT | Mod: 25,S$GLB,, | Performed by: UROLOGY

## 2019-03-04 PROCEDURE — 3008F BODY MASS INDEX DOCD: CPT | Mod: CPTII,S$GLB,, | Performed by: UROLOGY

## 2019-03-04 PROCEDURE — 3008F PR BODY MASS INDEX (BMI) DOCUMENTED: ICD-10-PCS | Mod: CPTII,S$GLB,, | Performed by: UROLOGY

## 2019-03-04 PROCEDURE — 3075F PR MOST RECENT SYSTOLIC BLOOD PRESS GE 130-139MM HG: ICD-10-PCS | Mod: CPTII,S$GLB,, | Performed by: UROLOGY

## 2019-03-04 PROCEDURE — 99204 PR OFFICE/OUTPT VISIT, NEW, LEVL IV, 45-59 MIN: ICD-10-PCS | Mod: 25,S$GLB,, | Performed by: UROLOGY

## 2019-03-04 PROCEDURE — 3079F PR MOST RECENT DIASTOLIC BLOOD PRESSURE 80-89 MM HG: ICD-10-PCS | Mod: CPTII,S$GLB,, | Performed by: UROLOGY

## 2019-03-04 PROCEDURE — 3079F DIAST BP 80-89 MM HG: CPT | Mod: CPTII,S$GLB,, | Performed by: UROLOGY

## 2019-03-04 NOTE — PATIENT INSTRUCTIONS
Bowel regimen as distended bowel has extrinsic compressive effect on bladder.   - any or all of the following in any combination, titrate to soft daily bowel movement without pushing or straining  - colace/stool softener capsule - once to twice daily  - miralax - 1 capful daily to start, can increase to 2x daily (or decrease to 1/2 cap daily)  - increase dietary fibery  - fiber supplements, such as metamucil  - prunes, prune juice    Stay well hydrated

## 2019-03-04 NOTE — LETTER
March 11, 2019        Darryn Johnson MD  [address]  [address]  [facility]  [place] [address]             [place] [facility]  [address] [place] [address] [address]  [place] [address]  Phone: [phone]  Fax: [phone]   Patient: Darryn Wilson   MR Number: [id number]   YOB: 1980   Date of Visit: 3/4/2019       Dear Dr. Johnson:    Thank you for referring Darryn Wilson to me for evaluation. Attached you will find relevant portions of my assessment and plan of care.    If you have questions, please do not hesitate to call me. I look forward to following Darryn Wilson along with you.    Sincerely,        Darryn Terrell MD            CC  No Recipients    Enclosure

## 2019-05-20 DIAGNOSIS — M96.1 POSTLAMINECTOMY SYNDROME, NOT ELSEWHERE CLASSIFIED: Primary | ICD-10-CM

## 2019-05-23 ENCOUNTER — TELEPHONE (OUTPATIENT)
Dept: UROLOGY | Facility: CLINIC | Age: 39
End: 2019-05-23

## 2019-09-12 ENCOUNTER — OFFICE VISIT (OUTPATIENT)
Dept: UROLOGY | Facility: CLINIC | Age: 39
End: 2019-09-12
Payer: MEDICARE

## 2019-09-12 VITALS
HEIGHT: 65 IN | BODY MASS INDEX: 28.36 KG/M2 | TEMPERATURE: 99 F | HEART RATE: 61 BPM | WEIGHT: 170.19 LBS | SYSTOLIC BLOOD PRESSURE: 160 MMHG | RESPIRATION RATE: 18 BRPM | DIASTOLIC BLOOD PRESSURE: 97 MMHG

## 2019-09-12 DIAGNOSIS — R39.9 LOWER URINARY TRACT SYMPTOMS (LUTS): Primary | ICD-10-CM

## 2019-09-12 DIAGNOSIS — N40.1 BENIGN PROSTATIC HYPERPLASIA WITH LOWER URINARY TRACT SYMPTOMS, SYMPTOM DETAILS UNSPECIFIED: ICD-10-CM

## 2019-09-12 DIAGNOSIS — E29.1 HYPOGONADISM IN MALE: ICD-10-CM

## 2019-09-12 LAB — POC RESIDUAL URINE VOLUME: 174 ML (ref 0–100)

## 2019-09-12 PROCEDURE — 3080F DIAST BP >= 90 MM HG: CPT | Mod: CPTII,S$GLB,, | Performed by: NURSE PRACTITIONER

## 2019-09-12 PROCEDURE — 3080F PR MOST RECENT DIASTOLIC BLOOD PRESSURE >= 90 MM HG: ICD-10-PCS | Mod: CPTII,S$GLB,, | Performed by: NURSE PRACTITIONER

## 2019-09-12 PROCEDURE — 51798 US URINE CAPACITY MEASURE: CPT | Mod: S$GLB,,, | Performed by: NURSE PRACTITIONER

## 2019-09-12 PROCEDURE — 99999 PR PBB SHADOW E&M-EST. PATIENT-LVL IV: CPT | Mod: PBBFAC,,, | Performed by: NURSE PRACTITIONER

## 2019-09-12 PROCEDURE — 51798 PR MEAS,POST-VOID RES,US,NON-IMAGING: ICD-10-PCS | Mod: S$GLB,,, | Performed by: NURSE PRACTITIONER

## 2019-09-12 PROCEDURE — 3077F SYST BP >= 140 MM HG: CPT | Mod: CPTII,S$GLB,, | Performed by: NURSE PRACTITIONER

## 2019-09-12 PROCEDURE — 99999 PR PBB SHADOW E&M-EST. PATIENT-LVL IV: ICD-10-PCS | Mod: PBBFAC,,, | Performed by: NURSE PRACTITIONER

## 2019-09-12 PROCEDURE — 99214 PR OFFICE/OUTPT VISIT, EST, LEVL IV, 30-39 MIN: ICD-10-PCS | Mod: 25,S$GLB,, | Performed by: NURSE PRACTITIONER

## 2019-09-12 PROCEDURE — 51741 ELECTRO-UROFLOWMETRY FIRST: CPT | Mod: S$GLB,,, | Performed by: NURSE PRACTITIONER

## 2019-09-12 PROCEDURE — 99214 OFFICE O/P EST MOD 30 MIN: CPT | Mod: 25,S$GLB,, | Performed by: NURSE PRACTITIONER

## 2019-09-12 PROCEDURE — 3077F PR MOST RECENT SYSTOLIC BLOOD PRESSURE >= 140 MM HG: ICD-10-PCS | Mod: CPTII,S$GLB,, | Performed by: NURSE PRACTITIONER

## 2019-09-12 PROCEDURE — 3008F BODY MASS INDEX DOCD: CPT | Mod: CPTII,S$GLB,, | Performed by: NURSE PRACTITIONER

## 2019-09-12 PROCEDURE — 3008F PR BODY MASS INDEX (BMI) DOCUMENTED: ICD-10-PCS | Mod: CPTII,S$GLB,, | Performed by: NURSE PRACTITIONER

## 2019-09-12 PROCEDURE — 51741 PR UROFLOWMETRY, COMPLEX: ICD-10-PCS | Mod: S$GLB,,, | Performed by: NURSE PRACTITIONER

## 2019-09-12 NOTE — PROGRESS NOTES
[facility] Note  Staff: KVNG Santamaria    PCP: Dr. Johnson  Endocrinologist:  Dr. Verdin    Chief Complaint: [id number] with LUTS on TRT    Subjective:        HPI: Darryn Wilson is a 39 y.o. male presents today for routine recheck of his LUTS.    Pt last seen by Dr. Terrell on 3/4/19 with hx of evaluation of BPH sxs/LUTS on TRT.     He has a history of a back injury and is followed by pain management, who started him on TRT, 200mg monthly.  He takes tizanidine, baclofen, MS contin. Managed by Dr Storm, who has also been Rxing his testosterone.  He does also have HTN on combination agent, and opioid induced chronic constipation on Linzess  At last evaluation 1/16/19 indicated he had discussed risk of worsening LUTS and prostate growth with his pain mgmt doc when starting TRT.  He reported difficulty initiating stream and emptying bladder, as well as occ mild dysuria. Improved after abx at visit in Oct 2018. Noted flomax didn't help him so he was no longer taking it.  Was noted to have eval by Dr Verdin for his hypgonadism and desire for fertility and was advised to switch to Clomid but hadnt started yet due to cost.     He last saw Dr Verdin on 12/14/18 noting he was diagnosed with hypogonadism ~4 years ago after being on pain medication for a back injury.   On disability from back injury at [facility] and has had multiple back injuries and has had multiple back surgeries. As well noted to have [place] score of 9 and no prior sleep study.  He is presently trying to conceive. He does have one 14 year old daughter. He did take clomid in the past and stopped when his insurance would not cover the medication. Sex drive is low, no morning erections. Body hair has decreased. Fatigue. He has not yet had an MRI or sperm analysis. Reports having sperm analysis about two years ago when he was on testosterone and reportedly this was low. Last testosterone was 229, free (9.3), Bioavaliable  (19.2), this was ~8 weeks off testosterone injections. Patient received his last injection ~ 1 week ago. Repeat T that day 751 on 18. (H/H 13/39, psa 0.29 with 79% free psa)  His hypogonadism was attributed to chronic opioid use and he was advised by Dr Verdin to stop the testosterone immediately and commence clomiphene 50mg QD.  He did not as per PCP visit above.     OV 3/4/19:  Had semen analysis done at [facility]  - reports low volume.  Didn't feel comfortable collecting there so not sure of effect on SA  Was having dysuria in oct and went away with abx but never completely.  Small amount of mild burning just when going to start urination - not constant - just as urine is starting to pass.   Feels like something stops and then starts going, and no pain during urination.  Sometimes urinates freely - sasha if well hydrated.  Thinks he had an STD, unsure of what it was, about last year and was treated for it. Urine was also cloudy. Had burning. Reports negative testing but improved after abx.  No perineal perirectal ejaculatory or testicular pain.  Flomax didn't change anything in the past.  AUA SS: 7/3 mixed (2: intermitt, sleeping; 1: emptying, freq, weak stream)  Occasional hesitancy occasionally okay.  If he is well hydrated has no problems urinating.  When he drinks a lot of water, no problems at all  + constipation - feels like has internal hemmorhoid stool is pushing past  6 stool softeners per day, 6 fiber per day, 1 500mg magnesium. Linzess when feeling backed up.      TODAY:  AUA SS Today: 9/2  Feeling of ICBE:3  Frequency:3  Intermittency:0  Urgency:0  Weak urine stream:1  Strainin  Nocturia:2    Uroflow results today:  Peak flow: 12.3 mL/s  Mean flow: 6.7mL/s  Voided time: 58.5s  Flow time: 53.7s  TTP flow: 9.4s  Voided volume: 358 mL    Pattern of curve: Abnormal    PVR by bladder scan performed by MA today:  174 mL    REVIEW OF SYSTEMS:  A comprehensive 10 system review was performed and  is negative except as noted above in HPI    PMHx:  Past Medical History:   Diagnosis Date    Allergy     DDD (degenerative disc disease), lumbosacral     Dysphagia     GERD (gastroesophageal reflux disease)     Hesitancy of micturition     Hypertension     Hypogonadism in male     Uncomplicated opioid dependence      PSHx:  Past Surgical History:   Procedure Laterality Date    LUMBAR FUSION N/A     MYELOGRAM N/A 6/19/2017    Performed by Clyde Manzano MD at [facility] CATH    spinal cord stimulator and then removed       Allergies:  Patient has no known allergies.    Medications: reviewed   Objective:     Vitals:    09/12/19 1326   BP: (!) 160/97   Pulse: 61   Resp: 18   Temp: 98.5 °F (36.9 °C)     General:WDWN in NAD  Eyes: PERRLA, normal conjunctiva  Respiratory: no increased work on breathing, clear to auscultation  Cardiovascular: regular rate and rhythm. No obvious extremity edema.  GI: palpation of masses. No tenderness. No hepatosplenomegaly to palpation.  Musculoskeletal: normal range of motion of bilateral upper extremities. Normal muscle strength and tone.  Skin: no obvious rashes or lesions. No tightening of skin noted.  Neurologic: CN grossly normal. Normal sensation.   Psychiatric: awake, alert and oriented x 3. Mood and affect normal. Cooperative.    Assessment:       1. Lower urinary tract symptoms (LUTS)    2. Benign prostatic hyperplasia with lower urinary tract symptoms, symptom details unspecified    3. Hypogonadism in male          Plan:   BPH with LUTS-Obstruction vs. Stricture?:    F/u with Dr. Terrell to discuss possible Cysto with TRUS.  Information on all procedures were thoroughly explained and given to pt during office visit today.    [facility]: N/A    KVNG Cosme

## 2019-09-13 ENCOUNTER — TELEPHONE (OUTPATIENT)
Dept: UROLOGY | Facility: CLINIC | Age: 39
End: 2019-09-13

## 2019-09-13 NOTE — TELEPHONE ENCOUNTER
----- Message from Darryn Terrell MD sent at 9/13/2019  3:53 PM CDT -----  Discussed w pt in detail at last o/v - wanted to try meds firt - can inform I reviewed his visit with quita and recommend proceeding   Offer 10/8, 10/22, 10/29  ----- Message -----  From: ELMER Marshall  Sent: 9/13/2019   2:54 PM  To: Amberly Kirkland LPN, Darryn Terrell MD, #    Pt was seen by me yesterday with note forwarded to Nidhi as well as discussed case with MD today.    Pt needs appt. With Nidhi due to yesterday's abnormal UFS.  Obstruction vs. Stricture?    He will need Cysto with TRUS, thanks.

## 2019-09-16 NOTE — TELEPHONE ENCOUNTER
Patient advised of recommendations.  Offered dates for procedures.  Patient says he would like to come in for OV to discuss this with Dr. Terrell prior to setting up procedures.  Appt scheduled for OV.

## 2019-09-21 ENCOUNTER — CLINICAL SUPPORT (OUTPATIENT)
Dept: URGENT CARE | Facility: CLINIC | Age: 39
End: 2019-09-21
Payer: MEDICARE

## 2019-09-21 DIAGNOSIS — H65.91 MIDDLE EAR EFFUSION, RIGHT: ICD-10-CM

## 2019-09-21 DIAGNOSIS — R35.0 URINARY FREQUENCY: ICD-10-CM

## 2019-09-21 DIAGNOSIS — R33.9 URINARY RETENTION: Primary | ICD-10-CM

## 2019-09-21 LAB
BILIRUB UR QL STRIP: NEGATIVE
GLUCOSE UR QL STRIP: NEGATIVE
KETONES UR QL STRIP: NEGATIVE
LEUKOCYTE ESTERASE UR QL STRIP: NEGATIVE
PH, POC UA: 6
POC BLOOD, URINE: NEGATIVE
POC NITRATES, URINE: NEGATIVE
PROT UR QL STRIP: NEGATIVE
SP GR UR STRIP: 1.01 (ref 1–1.03)
UROBILINOGEN UR STRIP-ACNC: NEGATIVE (ref 0.3–2.2)

## 2019-09-21 PROCEDURE — 81003 URINALYSIS AUTO W/O SCOPE: CPT | Mod: QW,S$GLB,, | Performed by: NURSE PRACTITIONER

## 2019-09-21 PROCEDURE — 99204 PR OFFICE/OUTPT VISIT, NEW, LEVL IV, 45-59 MIN: ICD-10-PCS | Mod: 25,S$GLB,, | Performed by: NURSE PRACTITIONER

## 2019-09-21 PROCEDURE — 99204 OFFICE O/P NEW MOD 45 MIN: CPT | Mod: 25,S$GLB,, | Performed by: NURSE PRACTITIONER

## 2019-09-21 PROCEDURE — 81003 POCT URINALYSIS, DIPSTICK, AUTOMATED, W/O SCOPE: ICD-10-PCS | Mod: QW,S$GLB,, | Performed by: NURSE PRACTITIONER

## 2019-09-21 RX ORDER — CIPROFLOXACIN 500 MG/1
500 TABLET ORAL EVERY 12 HOURS
Qty: 14 TABLET | Refills: 0 | Status: SHIPPED | OUTPATIENT
Start: 2019-09-21 | End: 2019-09-28

## 2019-09-21 NOTE — PROGRESS NOTES
Subjective:       Patient ID: Darryn Wilson is a 39 y.o. male.    Vitals:  vitals were not taken for this visit.     Chief Complaint: Urinary Retention    Patient complains of urinary frequency, urgency, and decreased urine for 1 week. Denies burning with urination. Denies flank pain, hematuria, penile discharge, fever. Patient also reports right ear pain only when waking up in the morning.     Urinary Frequency    This is a new problem. The current episode started in the past 7 days. The problem has been unchanged. The pain is at a severity of 1/10. There has been no fever. Associated symptoms include frequency and urgency. Pertinent negatives include no chills, hematuria, nausea, vomiting or rash. Associated symptoms comments: Urgency to urinate pt is also having right ear pain ( on /off) . He has tried nothing for the symptoms. The treatment provided no relief.       Constitution: Negative for chills, fatigue and fever.   HENT: Positive for ear pain. Negative for congestion and sore throat.    Neck: Negative for painful lymph nodes.   Cardiovascular: Negative for chest pain and leg swelling.   Eyes: Negative for double vision and blurred vision.   Respiratory: Negative for cough and shortness of breath.    Gastrointestinal: Negative for abdominal pain, nausea, vomiting and diarrhea.   Genitourinary: Positive for dysuria, frequency, urgency and urine decreased. Negative for hematuria, painful intercourse, penile discharge, painful ejaculation and penile pain.   Musculoskeletal: Negative for joint pain, joint swelling, muscle cramps and muscle ache.   Skin: Negative for color change, pale and rash.   Allergic/Immunologic: Negative for seasonal allergies.   Neurological: Negative for dizziness, history of vertigo, light-headedness, passing out and headaches.   Hematologic/Lymphatic: Negative for swollen lymph nodes, easy bruising/bleeding and history of blood clots. Does not bruise/bleed easily.    Psychiatric/Behavioral: Negative for nervous/anxious, sleep disturbance and depression. The patient is not nervous/anxious.        Objective:      Physical Exam   Constitutional: He is oriented to person, place, and time. He appears well-developed and well-nourished.  Non-toxic appearance. He does not have a sickly appearance. He does not appear ill.   HENT:   Head: Normocephalic.   Right Ear: Tympanic membrane, external ear and ear canal normal.   Left Ear: Tympanic membrane, external ear and ear canal normal.   Nose: Nose normal.   Mouth/Throat: Uvula is midline and oropharynx is clear and moist.   Eyes: Pupils are equal, round, and reactive to light. Conjunctivae and EOM are normal.   Neck: Normal range of motion and full passive range of motion without pain. Neck supple.   Cardiovascular: Normal rate, regular rhythm and normal heart sounds.   Pulmonary/Chest: Effort normal and breath sounds normal.   Abdominal: Soft. Normal appearance and bowel sounds are normal. There is no tenderness.   Musculoskeletal: Normal range of motion.   Lymphadenopathy:     He has no cervical adenopathy.   Neurological: He is alert and oriented to person, place, and time. GCS eye subscore is 4. GCS verbal subscore is 5. GCS motor subscore is 6.   Skin: Skin is warm, dry and intact. No rash noted.   Psychiatric: He has a normal mood and affect.       Assessment:       1. Urinary retention    2. Urinary frequency    3. Middle ear effusion, right        Plan:       UA negative.     Patient presents with urinary frequency and urgency. No leukocytes or nitrites on urinalysis in clinic. He has no CVA or abdominal tenderness to suggest pyelonephritis, nephrolithiasis. Patient appears well hydrated and nontoxic. Will send urine for culture and treat with Cipro while awaiting culture. Patient advised that if urine culture is unremarkable, he will require follow up with urology.    Urinary retention  -     POCT Urinalysis, Dipstick,  Automated, W/O Scope  -     Culture, Urine    Urinary frequency  -     Culture, Urine    Middle ear effusion, right    Other orders  -     ciprofloxacin HCl (CIPRO) 500 MG tablet; Take 1 tablet (500 mg total) by mouth every 12 (twelve) hours. for 7 days  Dispense: 14 tablet; Refill: 0

## 2019-09-21 NOTE — PATIENT INSTRUCTIONS
Urinary Retention (Male)  Urinary retention is the medical term for difficulty or inability to pass urine, even though your bladder is full.  Causes  The most common cause of urinary retention in men is the bladder outlet being blocked. This can be due to an enlarged prostate gland or a bladder infection. Certain medicines can also cause this problem. This condition is more likely to occur as men get older.    This condition is treated by insertion of a catheter into the bladder to drain the urine. This provides immediate relief. The catheter may need to remain in place for a few days to prevent a recurrence. The catheter has a balloon on the tip which was inflated after insertion. This prevents the catheter from falling out.  Symptoms  Common symptoms of urinary retention include:  · Pain (not experienced by everyone)  · Frequent urination  · Feeling that the bladder is still full after urinating  · Incontinence (not being able to control the release of urine)  · Swollen abdomen  Treatment  This condition is treated by inserting a tube (catheter) into the bladder to drain the urine. This provides immediate relief. The catheter may need to stay in place for a few days. The catheter has a balloon on the tip, which is inflated after insertion. This prevents the catheter from falling out.  Home care  · If you were given antibiotics, take them until they are used up, or your healthcare provider tells you to stop. It is important to finish the antibiotics even though you feel better. This is to make sure your infection has cleared.  · If a catheter was left in place, it is important to keep bacteria from getting into the collection bag. Do not disconnect the catheter from the collection bag.  · Use a leg band to secure the drainage tube, so it does not pull on the catheter. Drain the collection bag when it becomes full using the drain spout at the bottom of the bag.  · Do not pull on or try to remove your catheter.  This will injure your urethra. The catheter must be removed by a healthcare provider.  Follow-up care  Follow up with your healthcare provider, or as advised.  If a catheter was left in place, it can usually be removed within 3 to 7 days. Some conditions require the catheter to stay in longer. Your healthcare provider will tell you when to return to have the catheter removed.  When to seek medical advice  Call your healthcare provider right away if any of these occur:  · Fever of 100.4ºF (38ºC) or higher, or as directed by your healthcare provider  · Bladder or lower-abdominal pain or fullness  · Abdominal swelling, nausea, vomiting, or back pain  · Blood or urine leakage around the catheter  · Bloody urine coming from the catheter (if a new symptom)  · Weakness, dizziness, or fainting  · Confusion or change in usual level of alertness  · If a catheter was left in place, return if:  ¨ Catheter falls out  ¨ Catheter stops draining for 6 hours  Date Last Reviewed: 7/26/2015  © 0136-8532 [employer] - is now [employer]. [address] [place] [address], [place] [address]. All rights reserved. This information is not intended as a substitute for professional medical care. Always follow your healthcare professional's instructions.

## 2019-09-25 LAB
BACTERIA UR CULT: NO GROWTH
BACTERIA UR CULT: NORMAL

## 2019-09-30 ENCOUNTER — TELEPHONE (OUTPATIENT)
Dept: URGENT CARE | Facility: CLINIC | Age: 39
End: 2019-09-30

## 2019-09-30 NOTE — TELEPHONE ENCOUNTER
----- Message from Cherelle Millan NP sent at 9/26/2019  5:15 PM CDT -----  Please call the patient regarding his result. Urine culture negative. If pt symptomatic RTC or see urology.

## 2019-10-01 ENCOUNTER — TELEPHONE (OUTPATIENT)
Dept: URGENT CARE | Facility: CLINIC | Age: 39
End: 2019-10-01

## 2019-10-03 ENCOUNTER — OFFICE VISIT (OUTPATIENT)
Dept: GASTROENTEROLOGY | Facility: CLINIC | Age: 39
End: 2019-10-03
Payer: MEDICARE

## 2019-10-03 VITALS
BODY MASS INDEX: 27.77 KG/M2 | DIASTOLIC BLOOD PRESSURE: 86 MMHG | WEIGHT: 166.88 LBS | HEART RATE: 60 BPM | SYSTOLIC BLOOD PRESSURE: 131 MMHG

## 2019-10-03 DIAGNOSIS — K64.9 HEMORRHOIDS, UNSPECIFIED HEMORRHOID TYPE: ICD-10-CM

## 2019-10-03 DIAGNOSIS — K59.09 CHRONIC CONSTIPATION: ICD-10-CM

## 2019-10-03 DIAGNOSIS — K63.89 NARCOTIC BOWEL SYNDROME DUE TO THERAPEUTIC USE: ICD-10-CM

## 2019-10-03 DIAGNOSIS — T40.605A NARCOTIC BOWEL SYNDROME DUE TO THERAPEUTIC USE: ICD-10-CM

## 2019-10-03 DIAGNOSIS — D64.9 ANEMIA, UNSPECIFIED TYPE: ICD-10-CM

## 2019-10-03 DIAGNOSIS — K62.89 RECTAL IRRITATION: ICD-10-CM

## 2019-10-03 DIAGNOSIS — K21.9 GASTROESOPHAGEAL REFLUX DISEASE WITHOUT ESOPHAGITIS: Primary | ICD-10-CM

## 2019-10-03 DIAGNOSIS — K62.5 RECTAL BLEEDING: ICD-10-CM

## 2019-10-03 PROCEDURE — 3075F SYST BP GE 130 - 139MM HG: CPT | Mod: CPTII,S$GLB,, | Performed by: INTERNAL MEDICINE

## 2019-10-03 PROCEDURE — 3079F PR MOST RECENT DIASTOLIC BLOOD PRESSURE 80-89 MM HG: ICD-10-PCS | Mod: CPTII,S$GLB,, | Performed by: INTERNAL MEDICINE

## 2019-10-03 PROCEDURE — 99999 PR PBB SHADOW E&M-EST. PATIENT-LVL III: ICD-10-PCS | Mod: PBBFAC,,, | Performed by: INTERNAL MEDICINE

## 2019-10-03 PROCEDURE — 3075F PR MOST RECENT SYSTOLIC BLOOD PRESS GE 130-139MM HG: ICD-10-PCS | Mod: CPTII,S$GLB,, | Performed by: INTERNAL MEDICINE

## 2019-10-03 PROCEDURE — 99204 PR OFFICE/OUTPT VISIT, NEW, LEVL IV, 45-59 MIN: ICD-10-PCS | Mod: S$GLB,,, | Performed by: INTERNAL MEDICINE

## 2019-10-03 PROCEDURE — 99999 PR PBB SHADOW E&M-EST. PATIENT-LVL III: CPT | Mod: PBBFAC,,, | Performed by: INTERNAL MEDICINE

## 2019-10-03 PROCEDURE — 3008F BODY MASS INDEX DOCD: CPT | Mod: CPTII,S$GLB,, | Performed by: INTERNAL MEDICINE

## 2019-10-03 PROCEDURE — 3008F PR BODY MASS INDEX (BMI) DOCUMENTED: ICD-10-PCS | Mod: CPTII,S$GLB,, | Performed by: INTERNAL MEDICINE

## 2019-10-03 PROCEDURE — 3079F DIAST BP 80-89 MM HG: CPT | Mod: CPTII,S$GLB,, | Performed by: INTERNAL MEDICINE

## 2019-10-03 PROCEDURE — 99204 OFFICE O/P NEW MOD 45 MIN: CPT | Mod: S$GLB,,, | Performed by: INTERNAL MEDICINE

## 2019-10-03 NOTE — PROGRESS NOTES
Subjective:       Patient ID: Darryn Wilson is a 39 y.o. male.    This patient is new to me.  Referring provider:  Dr. Johnson for rectal bleeding.      Chief Complaint: Rectal bleeding    Patient seen for rectal bleeding, onset several months ago, small to moderate amount, bright red in color, with associated signs/symptoms including chronic narcotic induced constipation/straining/rectal pressure and bulging, and alleviating/exacerbating factors including none.  He has been given Linzess which he states gives him good relief, however, he only uses it sparingly secondary to cost/drug coverage issues.  He also admits to chronic longstanding GERD symptoms with epigastric/chest burning with PO intake but denies weight loss, hematemesis, or dysphagia.  He has never had EGD or colonoscopy and denies family history of GI cancer in any first degree relatives.  He is on chronic narcotics secondary to back problems/past surgeries.  Per notes from Dr. Terrell's office, patient has also been seen for urinary retention.      Review of Systems   Constitutional: Negative for chills, fatigue and fever.   HENT: Negative for trouble swallowing.    Respiratory: Negative for cough, shortness of breath and wheezing.    Cardiovascular: Negative for chest pain and palpitations.   Gastrointestinal: Positive for abdominal pain, blood in stool and constipation. Negative for diarrhea, nausea and vomiting.        + GERD     Musculoskeletal: Positive for back pain. Negative for arthralgias and myalgias.   Skin: Negative for color change and rash.   Neurological: Negative for dizziness, weakness and numbness.   Psychiatric/Behavioral: Negative for confusion. The patient is not nervous/anxious.    All other systems reviewed and are negative.      Objective:       Vitals:    10/03/19 1500   BP: 131/86   Pulse: 60   Weight: 75.7 kg (166 lb 14.2 oz)       Physical Exam   Constitutional: He is oriented to person, place, and time. He appears  well-developed and well-nourished.   HENT:   Head: Normocephalic and atraumatic.   Eyes: Pupils are equal, round, and reactive to light. No scleral icterus.   Neck: Normal range of motion. Neck supple. No thyromegaly present.   Cardiovascular: Normal rate and regular rhythm.   No murmur heard.  Pulmonary/Chest: Effort normal and breath sounds normal. He has no wheezes.   Abdominal: Soft. Bowel sounds are normal. He exhibits no distension. There is no tenderness.   Lymphadenopathy:     He has no cervical adenopathy.   Neurological: He is alert and oriented to person, place, and time.   Skin: Skin is warm and dry. No rash noted. No erythema.   Psychiatric: He has a normal mood and affect. His behavior is normal.   Vitals reviewed.        Lab Results   Component Value Date    WBC 5.87 12/14/2018    HGB 13.0 (L) 12/14/2018    HCT 39.0 (L) 12/14/2018    MCV 85 12/14/2018     12/14/2018       CMP  Sodium   Date Value Ref Range Status   09/26/2018 141 136 - 145 mmol/L Final     Potassium   Date Value Ref Range Status   09/26/2018 4.2 3.5 - 5.1 mmol/L Final     Chloride   Date Value Ref Range Status   09/26/2018 103 95 - 110 mmol/L Final     CO2   Date Value Ref Range Status   09/26/2018 31 (H) 23 - 29 mmol/L Final     Glucose   Date Value Ref Range Status   09/26/2018 95 70 - 110 mg/dL Final     BUN, Bld   Date Value Ref Range Status   09/26/2018 9 6 - 20 mg/dL Final     Creatinine   Date Value Ref Range Status   09/26/2018 0.9 0.5 - 1.4 mg/dL Final     Calcium   Date Value Ref Range Status   09/26/2018 9.4 8.7 - 10.5 mg/dL Final     Total Protein   Date Value Ref Range Status   09/26/2018 6.6 6.0 - 8.4 g/dL Final     Albumin   Date Value Ref Range Status   12/14/2018 4.2 3.6 - 5.1 g/dL Final     Comment:     **Data from J Clin Invest 1974:53:819-828 and J Clin Endocrinol   Metab 1973 36:2485-9246. Men with clinically significant   hypogonadal symptoms and testosterone values repeatedly in the   range of the 200-300  ng/dL or less, may benefit from testosterone  treatment after adequate risk and benefits counseling.  For additional information, please refer to   http://education.[employer]/faq/IYQ030 (This link is   being provided for informational/ educational purposes only.)  This test was developed and its analytical performance   characteristics have been determined by [employer]. It has not been cleared or approved by the [place]  Food and Drug Administration. This assay has been validated   pursuant to the CLIA regulations and is used for clinical   purposes.  @ Test Performed By:  [facility]  Bassam Arredondo M.D., Ph.D.,   [address] [place] [id number]  [place]  [id number]       Total Bilirubin   Date Value Ref Range Status   09/26/2018 0.3 0.1 - 1.0 mg/dL Final     Comment:     For infants and newborns, interpretation of results should be based  on gestational age, weight and in agreement with clinical  observations.  Premature Infant recommended reference ranges:  Up to 24 hours.............<8.0 mg/dL  Up to 48 hours............<12.0 mg/dL  3-5 days..................<15.0 mg/dL  6-29 days.................<15.0 mg/dL       Alkaline Phosphatase   Date Value Ref Range Status   09/26/2018 44 (L) 55 - 135 U/L Final     AST   Date Value Ref Range Status   09/26/2018 17 10 - 40 U/L Final     ALT   Date Value Ref Range Status   09/26/2018 13 10 - 44 U/L Final     Anion Gap   Date Value Ref Range Status   09/26/2018 7 (L) 8 - 16 mmol/L Final     eGFR if    Date Value Ref Range Status   09/26/2018 >60.0 >60 mL/min/1.73 m^2 Final     eGFR if non    Date Value Ref Range Status   09/26/2018 >60.0 >60 mL/min/1.73 m^2 Final     Comment:     Calculation used to obtain the estimated glomerular filtration  rate (eGFR) is the CKD-EPI equation.          CT scan was independently visualized and reviewed by me and  marco BROWN.    Old records from Dr. Terrell reviewed and are as summarized above in the HPI.      Assessment:       1. Gastroesophageal reflux disease without esophagitis    2. Rectal bleeding    3. Hemorrhoids, unspecified hemorrhoid type    4. Rectal irritation    5. Chronic constipation    6. 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 Anemia, unspecified type        Plan:       1.  Continue Linzess if able.  Will give patient assistance coupon  2.  Recommend daily exercise, adequate water intake, and high fiber diet.  Recommend daily miralax (17g PO once or twice daily) with intermittently dosed dulcolax (every 2-3 days)  to facilitate bowel movements.  If no relief with this, consider adding emollient laxative (castor oil or mineral oil) +/- enema.  3.  Antireflux precautions including avoidance of late night eating and lying down soon after eating.     4.  EGD for above.  Check CBC and iron studies on day of scope.  5.  Colonoscopy for rectal bleeding  6.  Further recommendations to follow after above.  7.  Communication will be sent to the referring provider, Dr. Johnson regarding my assessment and plan on this patient via EPIC.

## 2019-10-03 NOTE — H&P (VIEW-ONLY)
Subjective:       Patient ID: Darryn Wilson is a 39 y.o. male.    This patient is new to me.  Referring provider:  Dr. Johnson for rectal bleeding.      Chief Complaint: Rectal bleeding    Patient seen for rectal bleeding, onset several months ago, small to moderate amount, bright red in color, with associated signs/symptoms including chronic narcotic induced constipation/straining/rectal pressure and bulging, and alleviating/exacerbating factors including none.  He has been given Linzess which he states gives him good relief, however, he only uses it sparingly secondary to cost/drug coverage issues.  He also admits to chronic longstanding GERD symptoms with epigastric/chest burning with PO intake but denies weight loss, hematemesis, or dysphagia.  He has never had EGD or colonoscopy and denies family history of GI cancer in any first degree relatives.  He is on chronic narcotics secondary to back problems/past surgeries.  Per notes from Dr. Terrell's office, patient has also been seen for urinary retention.      Review of Systems   Constitutional: Negative for chills, fatigue and fever.   HENT: Negative for trouble swallowing.    Respiratory: Negative for cough, shortness of breath and wheezing.    Cardiovascular: Negative for chest pain and palpitations.   Gastrointestinal: Positive for abdominal pain, blood in stool and constipation. Negative for diarrhea, nausea and vomiting.        + GERD     Musculoskeletal: Positive for back pain. Negative for arthralgias and myalgias.   Skin: Negative for color change and rash.   Neurological: Negative for dizziness, weakness and numbness.   Psychiatric/Behavioral: Negative for confusion. The patient is not nervous/anxious.    All other systems reviewed and are negative.      Objective:       Vitals:    10/03/19 1500   BP: 131/86   Pulse: 60   Weight: 75.7 kg (166 lb 14.2 oz)       Physical Exam   Constitutional: He is oriented to person, place, and time. He appears  well-developed and well-nourished.   HENT:   Head: Normocephalic and atraumatic.   Eyes: Pupils are equal, round, and reactive to light. No scleral icterus.   Neck: Normal range of motion. Neck supple. No thyromegaly present.   Cardiovascular: Normal rate and regular rhythm.   No murmur heard.  Pulmonary/Chest: Effort normal and breath sounds normal. He has no wheezes.   Abdominal: Soft. Bowel sounds are normal. He exhibits no distension. There is no tenderness.   Lymphadenopathy:     He has no cervical adenopathy.   Neurological: He is alert and oriented to person, place, and time.   Skin: Skin is warm and dry. No rash noted. No erythema.   Psychiatric: He has a normal mood and affect. His behavior is normal.   Vitals reviewed.        Lab Results   Component Value Date    WBC 5.87 12/14/2018    HGB 13.0 (L) 12/14/2018    HCT 39.0 (L) 12/14/2018    MCV 85 12/14/2018     12/14/2018       CMP  Sodium   Date Value Ref Range Status   09/26/2018 141 136 - 145 mmol/L Final     Potassium   Date Value Ref Range Status   09/26/2018 4.2 3.5 - 5.1 mmol/L Final     Chloride   Date Value Ref Range Status   09/26/2018 103 95 - 110 mmol/L Final     CO2   Date Value Ref Range Status   09/26/2018 31 (H) 23 - 29 mmol/L Final     Glucose   Date Value Ref Range Status   09/26/2018 95 70 - 110 mg/dL Final     BUN, Bld   Date Value Ref Range Status   09/26/2018 9 6 - 20 mg/dL Final     Creatinine   Date Value Ref Range Status   09/26/2018 0.9 0.5 - 1.4 mg/dL Final     Calcium   Date Value Ref Range Status   09/26/2018 9.4 8.7 - 10.5 mg/dL Final     Total Protein   Date Value Ref Range Status   09/26/2018 6.6 6.0 - 8.4 g/dL Final     Albumin   Date Value Ref Range Status   12/14/2018 4.2 3.6 - 5.1 g/dL Final     Comment:     **Data from J Clin Invest 1974:53:819-828 and J Clin Endocrinol   Metab 1973 36:9349-2481. Men with clinically significant   hypogonadal symptoms and testosterone values repeatedly in the   range of the 200-300  ng/dL or less, may benefit from testosterone  treatment after adequate risk and benefits counseling.  For additional information, please refer to   http://[email or web address].[employer]/faq/JGM890 (This link is   being provided for informational/ educational purposes only.)  This test was developed and its analytical performance   characteristics have been determined by [employer]  [facility]. It has not been cleared or approved by the [place]  Food and Drug Administration. This assay has been validated   pursuant to the CLIA regulations and is used for clinical   purposes.  @ Test Performed By:  [facility]  Bassam Arredondo M.D., Ph.D.,   [address] [place] [id number]  [place]  [id number]       Total Bilirubin   Date Value Ref Range Status   09/26/2018 0.3 0.1 - 1.0 mg/dL Final     Comment:     For infants and newborns, interpretation of results should be based  on gestational age, weight and in agreement with clinical  observations.  Premature Infant recommended reference ranges:  Up to 24 hours.............<8.0 mg/dL  Up to 48 hours............<12.0 mg/dL  3-5 days..................<15.0 mg/dL  6-29 days.................<15.0 mg/dL       Alkaline Phosphatase   Date Value Ref Range Status   09/26/2018 44 (L) 55 - 135 U/L Final     AST   Date Value Ref Range Status   09/26/2018 17 10 - 40 U/L Final     ALT   Date Value Ref Range Status   09/26/2018 13 10 - 44 U/L Final     Anion Gap   Date Value Ref Range Status   09/26/2018 7 (L) 8 - 16 mmol/L Final     eGFR if    Date Value Ref Range Status   09/26/2018 >60.0 >60 mL/min/1.73 m^2 Final     eGFR if non    Date Value Ref Range Status   09/26/2018 >60.0 >60 mL/min/1.73 m^2 Final     Comment:     Calculation used to obtain the estimated glomerular filtration  rate (eGFR) is the CKD-EPI equation.          CT scan was independently visualized and reviewed by me and  marco BROWN.    Old records from Dr. Terrell reviewed and are as summarized above in the HPI.      Assessment:       1. Gastroesophageal reflux disease without esophagitis    2. Rectal bleeding    3. Hemorrhoids, unspecified hemorrhoid type    4. Rectal irritation    5. Chronic constipation    6. 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 Anemia, unspecified type        Plan:       1.  Continue Linzess if able.  Will give patient assistance coupon  2.  Recommend daily exercise, adequate water intake, and high fiber diet.  Recommend daily miralax (17g PO once or twice daily) with intermittently dosed dulcolax (every 2-3 days)  to facilitate bowel movements.  If no relief with this, consider adding emollient laxative (castor oil or mineral oil) +/- enema.  3.  Antireflux precautions including avoidance of late night eating and lying down soon after eating.     4.  EGD for above.  Check CBC and iron studies on day of scope.  5.  Colonoscopy for rectal bleeding  6.  Further recommendations to follow after above.  7.  Communication will be sent to the referring provider, Dr. Johnson regarding my assessment and plan on this patient via EPIC.

## 2019-10-06 NOTE — PROGRESS NOTES
[facility]rology Progress Note    Darryn Wilson is a 39 y.o. male who presents for follow up of BPH/LUTS and incomplete emptying    He has a history of a back injury and is followed by pain management, who started him on TRT, 200mg monthly. He takes tizanidine, baclofen, MS contin. Managed by Dr Storm, who has also been Rxing his testosterone. He does also have HTN on combination agent, and opioid induced chronic constipation on Linzess  Noted difficulty initiating stream and emptying bladder, as well as occ mild dysuria. Improved after abx at visit in Oct 2018. Noted flomax didn't help him so he was no longer taking it.  Was noted to have eval by Dr Verdin for his hypgonadism and desire for fertility and was advised to switch to Clomid as hypogonadism was attributed to chronic opioid use and desired fertility  On disability from back injury at [facility] and has had multiple back injuries and has had multiple back surgeries.     I saw him initially March 2019 noting the dysuria in October which improved with abx but never completely. Small amount mild burning at start of urination - not constant - just as urine is starting to pass.  Feels like something stops and then starts going, and no pain during urination. Sometimes urinates freely - sasha if well hydrated.  Thinks he had an STD, unsure of what it was, about last year and was treated for it. Urine was also cloudy. Had burning. Reports negative testing but improved after abx.  No perineal perirectal ejaculatory or testicular pain. Flomax didn't change anything in the past.  AUA SS: 7/3 mixed (2: intermitt, sleeping; 1: emptying, freq, weak stream). Occasional hesitancy. If he is well hydrated has no problems urinating.   + constipation - feels like has internal hemmorhoid stool is pushing past. 6 stool softeners per day, 6 fiber per day, 1 500mg magnesium. Linzess when feeling backed up.   ARBEN 35g benign  Discussed concern for structure vs prostate  "obstruction, and effect of constipation on LUTS/emptying. Declined alpha blocker 2/2 to potential for retrograde ejaculation and desired fertility. Focused on conservative recs and reeval and consider cysto/trus.    Returned to see ALTHEA Perry 9/13/19 with AUA SS 9/2, Uroflow demonstrating blunted sawtooth obstructed intermittent pattern most consistent with structure, having voided 378cc over 118.5 sec with a peak flow rate of 8 cc/second and an average flow rate of 3.3 cc/second with for distinct intervals, each of which are choppy and intermittent. PVR 190cc. Advised cysto/trus but returns today to discuss further  He has seen Dr Montgomery in interim and has EGD/colonoscopy planned and was also advised to take Linzess regularly for his chronic constipation    He presents today noting  Denies intermittency, some weakening of stream  + hesitancy. Feels like he empties.  AUA SS 5/3  PVR by bladder scan today is 0cc.  Has had semenalysis at [facility] with reported low semen volume and abnormality, with concern for a blockage  Has notice some redness at tip of penis but no dysuria, no discharge.    ROS: A comprehensive 10 system review was performed and is negative except as noted above in HPI    PHYSICAL EXAM:    Vitals:    10/07/19 1426   BP: (!) 158/93   Pulse: 63   Resp: 18     Body mass index is 27.51 kg/m². Weight: 75 kg (165 lb 5.5 oz) Height: 5' 5" (165.1 cm)       General: Alert, cooperative, no distress, appears stated age   Head: Normocephalic, without obvious abnormality, atraumatic   Eyes: PERRL, conjunctiva/corneas clear   Lungs: Respirations unlabored   Heart: Warm and well perfused   Abdomen: soft NT ND  : normal phallus thea orthotopic meatus of normal size with stable birthmark/freckle to left of meatus and no significant erythema/redness/inflammation  Extremities: Extremities normal, atraumatic, no cyanosis or edema   Skin: Skin color, texture, turgor normal, no rashes or lesions   Psych: Appropriate "   Neurologic: Non-focal       Recent Results (from the past 336 hour(s))   POCT URINE DIPSTICK WITHOUT MICROSCOPE    Collection Time: 10/07/19  2:54 PM   Result Value Ref Range    Color, UA yellow     Spec Grav UA 1.005     pH, UA 6     WBC, UA neg     Nitrite, UA neg     Protein neg     Glucose, UA neg     Ketones, UA neg     Urobilinogen, UA 0.2     Bilirubin neg     Blood, UA neg        ASSESSMENT   1. Incomplete bladder emptying  POCT URINE DIPSTICK WITHOUT MICROSCOPE    POCT Bladder Scan       Plan    Urinary symptoms have subjectively improved today, and he does demonstrate bladder emptying today with a postvoid residual of 0 cc.  At last office visit he did have a PVR of 190 cc with a uroflow pattern concerning for stricture or obstruction.  He does have some urinary hesitancy but overall his urinary symptoms seem better controlled at this time.  We did have a risk benefit discussion about cystoscopy to evaluate for urethral strictures there was concern previously, and his uroflow pattern is consistent with this.  He is also concerned with fertility and has had reported low volume semen analysis with concern for a blockage.  We did differentiate urethral and urinary tract and ejaculatory and ejaculatory duct obstruction, however it is reasonable that if he does have a significant urethral stricture, that he may not completely ejaculate past this.  Did differentiate the 2 different concerns, and he has had a formal semen analysis at [facility] on fertility which I do not have the results of.  We did discuss that in the case of abnormal semen analysis and workup for fertility, he would need an andrologist or dedicated fertility specialist.  I am able to workup his urinary symptoms and lower tract, and if any blockages present within the urethra could facilitate relief of obstruction and we did briefly discuss management of urethral stricture disease if found to be present.  As he is relatively asymptomatic today  with good bladder emptying, we did have a risk benefit discussion about proceeding with cystoscopy, though he would like to proceed at this time.  Procedure in detail diagnostic flexible cystourethroscopy was discussed in detail with the patient including the use of local anesthesia with xylocaine jelly.  Cystoscopy has been scheduled at the [facility] on 11/12/19  Pt will bring semenalysis results at that time  20 mins spent in encounter over half in counseling

## 2019-10-07 ENCOUNTER — OFFICE VISIT (OUTPATIENT)
Dept: UROLOGY | Facility: CLINIC | Age: 39
End: 2019-10-07
Payer: MEDICARE

## 2019-10-07 VITALS
RESPIRATION RATE: 18 BRPM | HEART RATE: 63 BPM | DIASTOLIC BLOOD PRESSURE: 93 MMHG | BODY MASS INDEX: 27.56 KG/M2 | HEIGHT: 65 IN | SYSTOLIC BLOOD PRESSURE: 158 MMHG | WEIGHT: 165.38 LBS

## 2019-10-07 DIAGNOSIS — R33.9 INCOMPLETE BLADDER EMPTYING: Primary | ICD-10-CM

## 2019-10-07 LAB
BILIRUB SERPL-MCNC: NORMAL MG/DL
BLOOD URINE, POC: NORMAL
COLOR, POC UA: YELLOW
GLUCOSE UR QL STRIP: NORMAL
KETONES UR QL STRIP: NORMAL
LEUKOCYTE ESTERASE URINE, POC: NORMAL
NITRITE, POC UA: NORMAL
PH, POC UA: 6
PROTEIN, POC: NORMAL
SPECIFIC GRAVITY, POC UA: 1
UROBILINOGEN, POC UA: 0.2

## 2019-10-07 PROCEDURE — 99214 OFFICE O/P EST MOD 30 MIN: CPT | Mod: 25,S$GLB,, | Performed by: UROLOGY

## 2019-10-07 PROCEDURE — 51798 US URINE CAPACITY MEASURE: CPT | Mod: S$GLB,,, | Performed by: UROLOGY

## 2019-10-07 PROCEDURE — 3008F BODY MASS INDEX DOCD: CPT | Mod: CPTII,S$GLB,, | Performed by: UROLOGY

## 2019-10-07 PROCEDURE — 99999 PR PBB SHADOW E&M-EST. PATIENT-LVL IV: CPT | Mod: PBBFAC,,, | Performed by: UROLOGY

## 2019-10-07 PROCEDURE — 3077F PR MOST RECENT SYSTOLIC BLOOD PRESSURE >= 140 MM HG: ICD-10-PCS | Mod: CPTII,S$GLB,, | Performed by: UROLOGY

## 2019-10-07 PROCEDURE — 99214 PR OFFICE/OUTPT VISIT, EST, LEVL IV, 30-39 MIN: ICD-10-PCS | Mod: 25,S$GLB,, | Performed by: UROLOGY

## 2019-10-07 PROCEDURE — 51798 POCT BLADDER SCAN: ICD-10-PCS | Mod: S$GLB,,, | Performed by: UROLOGY

## 2019-10-07 PROCEDURE — 3080F DIAST BP >= 90 MM HG: CPT | Mod: CPTII,S$GLB,, | Performed by: UROLOGY

## 2019-10-07 PROCEDURE — 3077F SYST BP >= 140 MM HG: CPT | Mod: CPTII,S$GLB,, | Performed by: UROLOGY

## 2019-10-07 PROCEDURE — 3008F PR BODY MASS INDEX (BMI) DOCUMENTED: ICD-10-PCS | Mod: CPTII,S$GLB,, | Performed by: UROLOGY

## 2019-10-07 PROCEDURE — 81002 POCT URINE DIPSTICK WITHOUT MICROSCOPE: ICD-10-PCS | Mod: S$GLB,,, | Performed by: UROLOGY

## 2019-10-07 PROCEDURE — 81002 URINALYSIS NONAUTO W/O SCOPE: CPT | Mod: S$GLB,,, | Performed by: UROLOGY

## 2019-10-07 PROCEDURE — 99999 PR PBB SHADOW E&M-EST. PATIENT-LVL IV: ICD-10-PCS | Mod: PBBFAC,,, | Performed by: UROLOGY

## 2019-10-07 PROCEDURE — 3080F PR MOST RECENT DIASTOLIC BLOOD PRESSURE >= 90 MM HG: ICD-10-PCS | Mod: CPTII,S$GLB,, | Performed by: UROLOGY

## 2019-10-07 RX ORDER — LIDOCAINE HYDROCHLORIDE 20 MG/ML
JELLY TOPICAL ONCE
Status: CANCELLED | OUTPATIENT
Start: 2019-10-07 | End: 2019-10-07

## 2019-10-08 LAB — POC RESIDUAL URINE VOLUME: 0 ML (ref 0–100)

## 2019-10-09 ENCOUNTER — HOSPITAL ENCOUNTER (OUTPATIENT)
Facility: HOSPITAL | Age: 39
Discharge: HOME | End: 2019-10-09
Attending: INTERNAL MEDICINE | Admitting: INTERNAL MEDICINE
Payer: MEDICARE

## 2019-10-09 ENCOUNTER — ANESTHESIA EVENT (OUTPATIENT)
Dept: ENDOSCOPY | Facility: HOSPITAL | Age: 39
End: 2019-10-09
Payer: MEDICARE

## 2019-10-09 ENCOUNTER — ANESTHESIA (OUTPATIENT)
Dept: ENDOSCOPY | Facility: HOSPITAL | Age: 39
End: 2019-10-09
Payer: MEDICARE

## 2019-10-09 VITALS
TEMPERATURE: 99 F | BODY MASS INDEX: 27.49 KG/M2 | HEART RATE: 52 BPM | HEIGHT: 65 IN | WEIGHT: 165 LBS | SYSTOLIC BLOOD PRESSURE: 139 MMHG | RESPIRATION RATE: 14 BRPM | OXYGEN SATURATION: 99 % | DIASTOLIC BLOOD PRESSURE: 83 MMHG

## 2019-10-09 DIAGNOSIS — K21.9 GERD (GASTROESOPHAGEAL REFLUX DISEASE): ICD-10-CM

## 2019-10-09 DIAGNOSIS — K29.70 GASTRITIS, PRESENCE OF BLEEDING UNSPECIFIED, UNSPECIFIED CHRONICITY, UNSPECIFIED GASTRITIS TYPE: Primary | ICD-10-CM

## 2019-10-09 DIAGNOSIS — K44.9 HIATAL HERNIA: ICD-10-CM

## 2019-10-09 PROCEDURE — D9220A PRA ANESTHESIA: ICD-10-PCS | Mod: CRNA,,, | Performed by: NURSE ANESTHETIST, CERTIFIED REGISTERED

## 2019-10-09 PROCEDURE — 43239 EGD BIOPSY SINGLE/MULTIPLE: CPT | Performed by: INTERNAL MEDICINE

## 2019-10-09 PROCEDURE — 88342 TISSUE SPECIMEN TO PATHOLOGY - SURGERY: ICD-10-PCS | Mod: 26,,, | Performed by: PATHOLOGY

## 2019-10-09 PROCEDURE — 63600175 PHARM REV CODE 636 W HCPCS: Performed by: INTERNAL MEDICINE

## 2019-10-09 PROCEDURE — 63600175 PHARM REV CODE 636 W HCPCS: Performed by: NURSE ANESTHETIST, CERTIFIED REGISTERED

## 2019-10-09 PROCEDURE — 88305 TISSUE SPECIMEN TO PATHOLOGY - SURGERY: ICD-10-PCS | Mod: 26,,, | Performed by: PATHOLOGY

## 2019-10-09 PROCEDURE — 43239 EGD BIOPSY SINGLE/MULTIPLE: CPT | Mod: ,,, | Performed by: INTERNAL MEDICINE

## 2019-10-09 PROCEDURE — D9220A PRA ANESTHESIA: Mod: ANES,,, | Performed by: ANESTHESIOLOGY

## 2019-10-09 PROCEDURE — 43239 PR EGD, FLEX, W/BIOPSY, SGL/MULTI: ICD-10-PCS | Mod: ,,, | Performed by: INTERNAL MEDICINE

## 2019-10-09 PROCEDURE — 37000008 HC ANESTHESIA 1ST 15 MINUTES: Performed by: INTERNAL MEDICINE

## 2019-10-09 PROCEDURE — 88342 IMHCHEM/IMCYTCHM 1ST ANTB: CPT | Mod: 26,,, | Performed by: PATHOLOGY

## 2019-10-09 PROCEDURE — 27201012 HC FORCEPS, HOT/COLD, DISP: Performed by: INTERNAL MEDICINE

## 2019-10-09 PROCEDURE — 88305 TISSUE EXAM BY PATHOLOGIST: CPT | Performed by: PATHOLOGY

## 2019-10-09 PROCEDURE — D9220A PRA ANESTHESIA: ICD-10-PCS | Mod: ANES,,, | Performed by: ANESTHESIOLOGY

## 2019-10-09 PROCEDURE — D9220A PRA ANESTHESIA: Mod: CRNA,,, | Performed by: NURSE ANESTHETIST, CERTIFIED REGISTERED

## 2019-10-09 RX ORDER — SODIUM CHLORIDE 9 MG/ML
INJECTION, SOLUTION INTRAVENOUS CONTINUOUS
Status: DISCONTINUED | OUTPATIENT
Start: 2019-10-09 | End: 2019-10-09 | Stop reason: HOSPADM

## 2019-10-09 RX ORDER — LIDOCAINE HCL/PF 100 MG/5ML
SYRINGE (ML) INTRAVENOUS
Status: DISCONTINUED | OUTPATIENT
Start: 2019-10-09 | End: 2019-10-09

## 2019-10-09 RX ORDER — PROPOFOL 10 MG/ML
VIAL (ML) INTRAVENOUS
Status: DISCONTINUED | OUTPATIENT
Start: 2019-10-09 | End: 2019-10-09

## 2019-10-09 RX ORDER — PANTOPRAZOLE SODIUM 40 MG/1
40 TABLET, DELAYED RELEASE ORAL DAILY
Qty: 90 TABLET | Refills: 3 | Status: SHIPPED | OUTPATIENT
Start: 2019-10-09 | End: 2020-10-08

## 2019-10-09 RX ADMIN — PROPOFOL 50 MG: 10 INJECTION, EMULSION INTRAVENOUS at 07:10

## 2019-10-09 RX ADMIN — PROPOFOL 150 MG: 10 INJECTION, EMULSION INTRAVENOUS at 07:10

## 2019-10-09 RX ADMIN — LIDOCAINE HYDROCHLORIDE 100 MG: 20 INJECTION, SOLUTION INTRAVENOUS at 07:10

## 2019-10-09 RX ADMIN — SODIUM CHLORIDE 1000 ML: 0.9 INJECTION, SOLUTION INTRAVENOUS at 07:10

## 2019-10-09 NOTE — ANESTHESIA PREPROCEDURE EVALUATION
10/09/2019  Darryn Wilson is a 39 y.o., male.    Anesthesia Evaluation    I have reviewed the Patient Summary Reports.    I have reviewed the Nursing Notes.   I have reviewed the Medications.     Review of Systems  Anesthesia Hx:  No problems with previous Anesthesia    Social:  Former Smoker    Cardiovascular:   Hypertension    Hepatic/GI:   GERD    Musculoskeletal:   Arthritis   Spine Disorders: Degenerative disease and Disc disease    Psych:   Psychiatric History          Physical Exam  General:  Well nourished    Airway/Jaw/Neck:  Airway Findings: Mouth Opening: Normal Tongue: Normal  General Airway Assessment: Adult  Mallampati: I        Eyes/Ears/Nose:  EYES/EARS/NOSE FINDINGS: Normal   Dental:  DENTAL FINDINGS: Normal   Chest/Lungs:  Chest/Lungs Findings: Clear to auscultation, Normal Respiratory Rate     Heart/Vascular:  Heart Findings: Rate: Normal  Rhythm: Regular Rhythm        Mental Status:  Mental Status Findings:  Cooperative, Alert and Oriented         Anesthesia Plan  Type of Anesthesia, risks & benefits discussed:  Anesthesia Type:  general  Patient's Preference:   Intra-op Monitoring Plan: standard ASA monitors  Intra-op Monitoring Plan Comments:   Post Op Pain Control Plan: IV/PO Opioids PRN and multimodal analgesia  Post Op Pain Control Plan Comments:   Induction:   IV  Beta Blocker:  Patient is not currently on a Beta-Blocker (No further documentation required).       Informed Consent: Patient understands risks and agrees with Anesthesia plan.  Questions answered. Anesthesia consent signed with patient.  ASA Score: 2     Day of Surgery Review of History & Physical: I have interviewed and examined the patient. I have reviewed the patient's H&P dated:  There are no significant changes.  H&P update referred to the surgeon.         Ready For Surgery From Anesthesia Perspective.

## 2019-10-09 NOTE — TRANSFER OF CARE
"Anesthesia Transfer of Care Note    Patient: Darryn Wilson    Procedure(s) Performed: Procedure(s) (LRB):  EGD (ESOPHAGOGASTRODUODENOSCOPY) (N/A)    Patient location: GI    Anesthesia Type: general    Transport from OR: Transported from OR on room air with adequate spontaneous ventilation    Post pain: adequate analgesia    Post assessment: no apparent anesthetic complications    Post vital signs: stable    Level of consciousness: awake    Nausea/Vomiting: no nausea/vomiting    Complications: none    Transfer of care protocol was followed      Last vitals:   Visit Vitals  /80   Pulse (!) 48   Temp 36.8 °C (98.2 °F) (Skin)   Resp 20   Ht 5' 5" (1.651 m)   Wt 74.8 kg (165 lb)   SpO2 100%   BMI 27.46 kg/m²     "

## 2019-10-09 NOTE — DISCHARGE INSTRUCTIONS
Understanding Gastritis    Gastritis is a painful inflammation of the stomach lining. It has a number of causes. Gastritis and its symptoms can be relieved with treatment. Work with your healthcare provider to find ways to treat your symptoms.  The Stomach  To digest the food you eat, your stomach makes strong acids and enzymes. A healthy stomach has built-in defenses that protect its lining from damage by these acids and enzymes.  When you have gastritis  Acids may damage the stomach lining when the built-in defenses of the stomach dont function as they should. The stomach lining can then become inflamed. When this happens, it is called gastritis.  Causes of gastritis  Gastritis has many causes. They may include:  · Aspirin and anti-inflammatory medicines  · Tobacco use  · Alcohol use  · Helicobacter pylori (H. pylori) bacteria  · Trauma from injuries, burns, or major surgery  · Critical illness or autoimmune disorders  Common symptoms  With gastritis, you may notice one or more of the following:  · A burning feeling in your upper belly  · Pain that happens after eating certain foods  · Gas or a bloated feeling in your stomach  · Frequent belching  · Nausea with or without vomiting  · Loss of appetite  · Feeling full quickly  · Fatigue  Date Last Reviewed: 7/1/2016  © 4686-8597 [employer]. [address] [place], [place] [id number]. All rights reserved. This information is not intended as a substitute for professional medical care. Always follow your healthcare professional's instructions.        What Is a Hiatal Hernia?    Hiatal hernia is when the area where the stomach and esophagus meet bulges up through the diaphragm into the chest cavity. In some cases, part of the stomach may bulge above the diaphragm. Stomach acid may move up into the esophagus and cause symptoms. The symptoms are often blamed on gastroesophageal reflux disease (GERD). You may only know about the hernia when it shows up on  an X-ray taken for other reasons.   What you may feel  The hiatus is a normal hole in the diaphragm. The esophagus passes through this hole and leads to the stomach. In some cases, part of the stomach may bulge above the diaphragm. This bulge is called a hernia. Stomach acid may move up into the esophagus and cause symptoms.  When you eat, the muscle at the hiatus relaxes to allow food to pass into the stomach. It tightens again to keep food and digestive acids in the stomach.  Many people with hiatal hernias have mild symptoms. You may notice the following GERD symptoms:  · Heartburn or other chest discomfort  · A feeling of chest fullness after a meal  · Frequent burping  · Acid taste in the mouth  · Trouble swallowing  Treating symptoms  If you have been diagnosed with hiatal hernia, these suggestions may help improve symptoms:  · Lose excess weight. Extra weight puts pressure on the stomach and esophagus.  · Dont lie down after eating. Sit up for at least an hour after eating. Lying down after eating can increase symptoms.  · Avoid certain foods and drinks. These include fatty foods, chocolate, coffee, mint, and other foods that cause symptoms for you.  · Dont smoke or drink alcohol. These can worsen symptoms.  · Look at your medicines. Discuss your medicines with your healthcare provider. Many medicines can cause symptoms.  · Consider an antacid medicine. Ask your healthcare provider about over-the-counter and prescription medicines that may help.  · Ask about surgery, if needed. Surgery is a treatment choice for some people. Your healthcare provider can determine if surgery is an option for you.    Date Last Reviewed: 10/1/2016  © 7522-7847 [employer]. [address] [place] [address], [place] [address]. All rights reserved. This information is not intended as a substitute for professional medical care. Always follow your healthcare professional's instructions.        Upper GI Endoscopy     During  endoscopy, a long, flexible tube is used to view the inside of your upper GI tract.      Upper GI endoscopy allows your healthcare provider to look directly into the beginning of your gastrointestinal (GI) tract. The esophagus, stomach, and duodenum (the first part of the small intestine) make up the upper GI tract.   Before the exam  Follow these and any other instructions you are given before your endoscopy. If you dont follow the healthcare providers instructions carefully, the test may need to be canceled or done over:  · Don't eat or drink anything after midnight the night before your exam. If your exam is in the afternoon, drink only clear liquids in the morning. Don't eat or drink anything for 8 hours before the exam. In some cases, you may be able to take medicines with sips of water until 2 hours before the procedure. Speak with your healthcare provider about this.   · Bring your X-rays and any other test results you have.  · Because you will be sedated, arrange for an adult to drive you home after the exam.  · Tell your healthcare provider before the exam if you are taking any medicines or have any medical problems.  The procedure  Here is what to expect:  · You will lie on the endoscopy table. Usually patients lie on the left side.  · You will be monitored and given oxygen.  · Your throat may be numbed with a spray or gargle. You are given medicine through an intravenous (IV) line that will help you relax and remain comfortable. You may be awake or asleep during the procedure.  · The healthcare provider will put the endoscope in your mouth and down your esophagus. It is thinner than most pieces of food that you swallow. It will not affect your breathing. The medicine helps keep you from gagging.  · Air is put into your GI tract to expand it. It can make you burp.  · During the procedure, the healthcare provider can take biopsies (tissue samples), remove abnormalities, such as polyps, or treat  abnormalities through a variety of devices placed through the endoscope. You will not feel this.   · The endoscope carries images of your upper GI tract to a video screen. If you are awake, you may be able to look at the images.  · After the procedure is done, you will rest for a time. An adult must drive you home.  When to call your healthcare provider  Contact your healthcare provider if you have:  · Black or tarry stools, or blood in your stool  · Fever  · Pain in your belly that does not go away  · Nausea and vomiting, or vomiting blood   Date Last Reviewed: 7/1/2016  © 3739-9052 [employer]. [address] [place] [address], [place] [address]. All rights reserved. This information is not intended as a substitute for professional medical care. Always follow your healthcare professional's instructions.        Discharge Instructions: After Your Surgery  Youve just had surgery. During surgery, you were given medicine called anesthesia to keep you relaxed and free of pain. After surgery, you may have some pain or nausea. This is common. Here are some tips for feeling better and getting well after surgery.     Stay on schedule with your medicine.   Going home  Your healthcare provider will show you how to take care of yourself when you go home. He or she will also answer your questions. Have an adult family member or friend drive you home. For the first 24 hours after your surgery:  · Do not drive or use heavy equipment.  · Do not make important decisions or sign legal papers.  · Do not drink alcohol.  · Have someone stay with you, if needed. He or she can watch for problems and help keep you safe.  Be sure to go to all follow-up visits with your healthcare provider. And rest after your surgery for as long as your healthcare provider tells you to.  Coping with pain  If you have pain after surgery, pain medicine will help you feel better. Take it as told, before pain becomes severe. Also, ask your healthcare  provider or pharmacist about other ways to control pain. This might be with heat, ice, or relaxation. And follow any other instructions your surgeon or nurse gives you.  Tips for taking pain medicine  To get the best relief possible, remember these points:  · Pain medicines can upset your stomach. Taking them with a little food may help.  · Most pain relievers taken by mouth need at least 20 to 30 minutes to start to work.  · Taking medicine on a schedule can help you remember to take it. Try to time your medicine so that you can take it before starting an activity. This might be before you get dressed, go for a walk, or sit down for dinner.  · Constipation is a common side effect of pain medicines. Call your healthcare provider before taking any medicines such as laxatives or stool softeners to help ease constipation. Also ask if you should skip any foods. Drinking lots of fluids and eating foods such as fruits and vegetables that are high in fiber can also help. Remember, do not take laxatives unless your surgeon has prescribed them.  · Drinking alcohol and taking pain medicine can cause dizziness and slow your breathing. It can even be deadly. Do not drink alcohol while taking pain medicine.  · Pain medicine can make you react more slowly to things. Do not drive or run machinery while taking pain medicine.  Your healthcare provider may tell you to take acetaminophen to help ease your pain. Ask him or her how much you are supposed to take each day. Acetaminophen or other pain relievers may interact with your prescription medicines or other over-the-counter (OTC) medicines. Some prescription medicines have acetaminophen and other ingredients. Using both prescription and OTC acetaminophen for pain can cause you to overdose. Read the labels on your OTC medicines with care. This will help you to clearly know the list of ingredients, how much to take, and any warnings. It may also help you not take too  much acetaminophen. If you have questions or do not understand the information, ask your pharmacist or healthcare provider to explain it to you before you take the OTC medicine.  Managing nausea  Some people have an upset stomach after surgery. This is often because of anesthesia, pain, or pain medicine, or the stress of surgery. These tips will help you handle nausea and eat healthy foods as you get better. If you were on a special food plan before surgery, ask your healthcare provider if you should follow it while you get better. These tips may help:  · Do not push yourself to eat. Your body will tell you when to eat and how much.  · Start off with clear liquids and soup. They are easier to digest.  · Next try semi-solid foods, such as mashed potatoes, applesauce, and gelatin, as you feel ready.  · Slowly move to solid foods. Dont eat fatty, rich, or spicy foods at first.  · Do not force yourself to have 3 large meals a day. Instead eat smaller amounts more often.  · Take pain medicines with a small amount of solid food, such as crackers or toast, to avoid nausea.     Call your surgeon if  · You still have pain an hour after taking medicine. The medicine may not be strong enough.  · You feel too sleepy, dizzy, or groggy. The medicine may be too strong.  · You have side effects like nausea, vomiting, or skin changes, such as rash, itching, or hives.       If you have obstructive sleep apnea  You were given anesthesia medicine during surgery to keep you comfortable and free of pain. After surgery, you may have more apnea spells because of this medicine and other medicines you were given. The spells may last longer than usual.   At home:  · Keep using the continuous positive airway pressure (CPAP) device when you sleep. Unless your healthcare provider tells you not to, use it when you sleep, day or night. CPAP is a common device used to treat obstructive sleep apnea.  · Talk with your provider before taking any  pain medicine, muscle relaxants, or sedatives. Your provider will tell you about the possible dangers of taking these medicines.  Date Last Reviewed: 12/1/2016  © 8604-8312 [employer]. [address] [place] [address], [place] [address]. All rights reserved. This information is not intended as a substitute for professional medical care. Always follow your healthcare professional's instructions.

## 2019-10-09 NOTE — [employer] PATIENT INSTRUCTIONS
Discharge Summary/Instructions after an Endoscopic Procedure  Patient Name: Darryn Wilson  Patient MRN: [id number]  Patient YOB: 1980 Wednesday, October 09, 2019  Brendan Orellana MD  RESTRICTIONS:  During your procedure today, you received medications for sedation.  These   medications may affect your judgment, balance and coordination.  Therefore,   for 24 hours, you have the following restrictions:   - DO NOT drive a car, operate machinery, make legal/financial decisions,   sign important papers or drink alcohol.    ACTIVITY:  Today: no heavy lifting, straining or running due to procedural   sedation/anesthesia.  The following day: return to full activity including work.  DIET:  Eat and drink normally unless instructed otherwise.     TREATMENT FOR COMMON SIDE EFFECTS:  - Mild abdominal pain, nausea, belching, bloating or excessive gas:  rest,   eat lightly and use a heating pad.  - Sore Throat: treat with throat lozenges and/or gargle with warm salt   water.  - Because air was used during the procedure, expelling large amounts of air   from your rectum or belching is normal.  - If a bowel prep was taken, you may not have a bowel movement for 1-3 days.    This is normal.  SYMPTOMS TO WATCH FOR AND REPORT TO YOUR PHYSICIAN:  1. Abdominal pain or bloating, other than gas cramps.  2. Chest pain.  3. Back pain.  4. Signs of infection such as: chills or fever occurring within 24 hours   after the procedure.  5. Rectal bleeding, which would show as bright red, maroon, or black stools.   (A tablespoon of blood from the rectum is not serious, especially if   hemorrhoids are present.)  6. Vomiting.  7. Weakness or dizziness.  GO DIRECTLY TO THE NEAREST EMERGENCY ROOM IF YOU HAVE ANY OF THE FOLLOWING:      Difficulty breathing              Chills and/or fever over 101 F   Persistent vomiting and/or vomiting blood   Severe abdominal pain   Severe chest pain   Black, tarry stools   Bleeding- more than one  tablespoon   Any other symptom or condition that you feel may need urgent attention  Your doctor recommends these additional instructions:  If any biopsies were taken, your doctors clinic will contact you in 1 to 2   weeks with any results.  - Patient has a contact number available for emergencies.  The signs and   symptoms of potential delayed complications were discussed with the   patient.  Return to normal activities tomorrow.  Written discharge   instructions were provided to the patient.   - Resume previous diet.   - Continue present medications.   - No aspirin, ibuprofen, naproxen, or other non-steroidal anti-inflammatory   drugs.   - Await pathology results.   - Discharge patient to home (ambulatory).   - Follow an antireflux regimen.   - Use Protonix (pantoprazole) 40 mg PO daily.   - Return to my office PRN.  For questions, problems or results please call your physician - Brendan Orellana MD at Work:  [phone].  [facility], EMERGENCY ROOM PHONE NUMBER: [phone]  IF A COMPLICATION OR EMERGENCY SITUATION ARISES AND YOU ARE UNABLE TO REACH   YOUR PHYSICIAN - GO DIRECTLY TO THE EMERGENCY ROOM.  Brendan Orellana MD  10/9/2019 7:45:09 AM  This report has been verified and signed electronically.  [employer]

## 2019-10-09 NOTE — ANESTHESIA POSTPROCEDURE EVALUATION
Anesthesia Post Evaluation    Patient: Darryn Wilson    Procedure(s) Performed: Procedure(s) (LRB):  EGD (ESOPHAGOGASTRODUODENOSCOPY) (N/A)    Final Anesthesia Type: general  Patient location during evaluation: PACU  Patient participation: Yes- Able to Participate  Level of consciousness: awake and alert  Post-procedure vital signs: reviewed and stable  Pain management: adequate  Airway patency: patent  PONV status at discharge: No PONV  Anesthetic complications: no      Cardiovascular status: hemodynamically stable  Respiratory status: unassisted and room air  Hydration status: euvolemic  Follow-up not needed.          Vitals Value Taken Time   /83 10/9/2019  8:25 AM   Temp 37.1 °C (98.8 °F) 10/9/2019  7:51 AM   Pulse 52 10/9/2019  8:25 AM   Resp 14 10/9/2019  8:25 AM   SpO2 99 % 10/9/2019  8:25 AM         Event Time     Out of Recovery 08:39:40          Pain/Joe Score: Joe Score: 10 (10/9/2019  8:18 AM)

## 2019-10-16 ENCOUNTER — TELEPHONE (OUTPATIENT)
Dept: GASTROENTEROLOGY | Facility: CLINIC | Age: 39
End: 2019-10-16

## 2019-10-16 RX ORDER — CLARITHROMYCIN 500 MG/1
500 TABLET, FILM COATED ORAL 2 TIMES DAILY
Qty: 20 TABLET | Refills: 0 | Status: SHIPPED | OUTPATIENT
Start: 2019-10-16 | End: 2019-10-26

## 2019-10-16 RX ORDER — AMOXICILLIN 500 MG/1
1000 CAPSULE ORAL EVERY 12 HOURS
Qty: 40 CAPSULE | Refills: 0 | Status: SHIPPED | OUTPATIENT
Start: 2019-10-16 | End: 2019-10-26

## 2019-10-16 NOTE — TELEPHONE ENCOUNTER
----- Message from Alexia Pena sent at 10/16/2019  4:54 PM CDT -----  Contact: self [phone]  He said to cancel the colonoscopy for tomorrow and he will call back when he wants to reschedule.  Thank you!

## 2019-10-17 ENCOUNTER — TELEPHONE (OUTPATIENT)
Dept: ENDOCRINOLOGY | Facility: CLINIC | Age: 39
End: 2019-10-17

## 2019-10-17 NOTE — TELEPHONE ENCOUNTER
----- Message from Katia Garcia MA sent at 10/16/2019  4:56 PM CDT -----      ----- Message -----  From: Alexia Pena  Sent: 10/16/2019   4:52 PM CDT  To: [facility] Staff    Type:  Sooner Apoointment Request    Caller is requesting a sooner appointment.  Caller declined first available appointment listed below.  Caller will not accept being placed on the waitlist and is requesting a message be sent to doctor.    Name of Caller:  Darryn  When is the first available appointment?  [facility] did not offer an appt  Symptoms:  Low testosterone  Best Call Back Number:  [phone]  Additional Information:  Thank you!

## 2019-10-21 ENCOUNTER — TELEPHONE (OUTPATIENT)
Dept: GASTROENTEROLOGY | Facility: CLINIC | Age: 39
End: 2019-10-21

## 2019-10-21 NOTE — LETTER
October 21, 2019    Darryn Wilson  [address]  [place] [address]             [facility]  [address]  [place] [address]  Phone: [phone] Dear Mr. Wilson:    Our office has been unable to reach you by phone to give you test results.  Results per Dr. Montgomery-  Please notify patient that biopsies were reviewed and showed bacteria (H.pylori) in the stomach and this needs to be treated with antibiotics.  These have already been sent to the patient's pharmacy ([facility]).  Also, while on antibiotics, the patient's antacid medicine (pantoprazole) MUST be taken twice daily.  Follow up in my office in 3 months.       If you have any questions or concerns, please don't hesitate to call.    Sincerely,        Jace Haq LPN

## 2019-11-11 ENCOUNTER — TELEPHONE (OUTPATIENT)
Dept: UROLOGY | Facility: CLINIC | Age: 39
End: 2019-11-11

## 2019-11-12 ENCOUNTER — HOSPITAL ENCOUNTER (OUTPATIENT)
Facility: AMBULARY SURGERY CENTER | Age: 39
Discharge: HOME | End: 2019-11-12
Attending: UROLOGY | Admitting: UROLOGY
Payer: MEDICARE

## 2019-11-12 DIAGNOSIS — R33.9 INCOMPLETE BLADDER EMPTYING: ICD-10-CM

## 2019-11-12 PROCEDURE — 52000 PR CYSTOURETHROSCOPY: ICD-10-PCS | Mod: ,,, | Performed by: UROLOGY

## 2019-11-12 PROCEDURE — 52000 CYSTOURETHROSCOPY: CPT | Performed by: UROLOGY

## 2019-11-12 PROCEDURE — 52000 CYSTOURETHROSCOPY: CPT | Mod: ,,, | Performed by: UROLOGY

## 2019-11-12 RX ORDER — LIDOCAINE HYDROCHLORIDE 20 MG/ML
JELLY TOPICAL ONCE
Status: COMPLETED | OUTPATIENT
Start: 2019-11-12 | End: 2019-11-12

## 2019-11-12 RX ORDER — WATER 1 ML/ML
IRRIGANT IRRIGATION
Status: DISCONTINUED | OUTPATIENT
Start: 2019-11-12 | End: 2019-11-12 | Stop reason: HOSPADM

## 2019-11-12 RX ORDER — CIPROFLOXACIN 500 MG/1
500 TABLET ORAL 2 TIMES DAILY
Qty: 4 TABLET | Refills: 0 | Status: ON HOLD | OUTPATIENT
Start: 2019-11-12 | End: 2021-10-13 | Stop reason: ALTCHOICE

## 2019-11-12 RX ORDER — TIZANIDINE HYDROCHLORIDE 6 MG/1
CAPSULE, GELATIN COATED ORAL
COMMUNITY
End: 2019-12-02

## 2019-11-12 RX ADMIN — LIDOCAINE HYDROCHLORIDE 5 ML: 20 JELLY TOPICAL at 02:11

## 2019-11-12 NOTE — DISCHARGE INSTRUCTIONS
Cystoscopy    Cystoscopy is a procedure that lets your doctor look directly inside your urethra and bladder. It can be used to:  · Help diagnose a problem with your urethra, bladder, or kidneys.  · Take a sample (biopsy) of bladder or urethral tissue.  · Treat certain problems (such as removing kidney stones).  · Place a stent to bypass an obstruction.  · Take special X-rays of the kidneys.  Based on the findings, your doctor may recommend other tests or treatments.  What is a cystoscope?  A cystoscope is a telescope-like instrument that contains lenses and fiberoptics (small glass wires that make bright light). The cystoscope may be straight and rigid, or flexible to bend around curves in the urethra. The doctor may look directly into the cystoscope, or project the image onto a monitor.  Getting ready  · Ask your doctor if you should stop taking any medicines before the procedure.  · Ask whether you should avoid eating or drinking anything after midnight before the procedure.  · Follow any other instructions your doctor gives you.  Tell your doctor before the exam if you:  · Take any medicines, such as aspirin or blood thinners  · Have allergies to any medicines  · Are pregnant   The procedure  Cystoscopy is done in the doctors office, surgery center, or hospital. The doctor and a nurse are present during the procedure. It takes only a few minutes, longer if a biopsy, X-ray, or treatment needs to be done.  During the procedure:  · You lie on an exam table on your back, knees bent and legs apart. You are covered with a drape.  · Your urethra and the area around it are washed. Anesthetic jelly may be applied to numb the urethra. Other pain medicine is usually not needed. In some cases, you may be offered a mild sedative to help you relax. If a more extensive procedure is to be done, such as a biopsy or kidney stone removal, general anesthesia may be needed.  · The cystoscope is inserted. A sterile fluid is put  into the bladder to expand it. You may feel pressure from this fluid.  · When the procedure is done, the cystoscope is removed.  After the procedure  If you had a sedative, general anesthesia, or spinal anesthesia, you must have someone drive you home. Once youre home:  · Drink plenty of fluids.  · You may have burning or light bleeding when you urinate--this is normal.  · Medicines may be prescribed to ease any discomfort or prevent infection. Take these as directed.  · Call your doctor if you have heavy bleeding or blood clots, burning that lasts more than a day, a fever over 100°F  (38° C), or trouble urinating.    After Surgery:  Always be aware that any surgery can cause these symptoms:    Pain- Medication can be prescribed for pain to decrease your pain but may not completely take your pain away.  Over the Counter pain medicine my be enough and you can always use Ice and rest to help ease pain.    Bleeding- a little bleeding after a surgery is usually within normal.  If there is a lot of blood you need to notify your MD.  Emergency treatments of bleeding are cold application, elevation of the bleeding site and compression.    Infection- Infection after surgery is NOT a normal occurrence.  Signs of infection are fever, swelling, hot to touch the incision.  If this occurs notify your MD immediately.    Nausea- this can be common after a surgery especially if you have had anesthesia medicine or are taking pain medicine.  Staying on clear liquids, bland foods, gingerale, or over the counter anti nausea medicines can help.  If you vomit more than once, notify your MD.  Anti Nausea medicines can be prescribed.

## 2019-11-12 NOTE — H&P
[facility] Urology Progress Note     Darryn Wilson is a 39 y.o. male who presents for follow up of BPH/LUTS and incomplete emptying     He has a history of a back injury and is followed by pain management, who started him on TRT, 200mg monthly. He takes tizanidine, baclofen, MS contin. Managed by Dr Storm, who has also been Rxing his testosterone. He does also have HTN on combination agent, and opioid induced chronic constipation on Linzess  Noted difficulty initiating stream and emptying bladder, as well as occ mild dysuria. Improved after abx at visit in Oct 2018. Noted flomax didn't help him so he was no longer taking it.  Was noted to have eval by Dr Verdin for his hypgonadism and desire for fertility and was advised to switch to Clomid as hypogonadism was attributed to chronic opioid use and desired fertility  On disability from back injury at [facility] and has had multiple back injuries and has had multiple back surgeries.     I saw him initially March 2019 noting the dysuria in October which improved with abx but never completely. Small amount mild burning at start of urination - not constant - just as urine is starting to pass.  Feels like something stops and then starts going, and no pain during urination. Sometimes urinates freely - sasha if well hydrated.  Thinks he had an STD, unsure of what it was, about last year and was treated for it. Urine was also cloudy. Had burning. Reports negative testing but improved after abx.  No perineal perirectal ejaculatory or testicular pain. Flomax didn't change anything in the past.  AUA SS: 7/3 mixed (2: intermitt, sleeping; 1: emptying, freq, weak stream). Occasional hesitancy. If he is well hydrated has no problems urinating.   + constipation - feels like has internal hemmorhoid stool is pushing past. 6 stool softeners per day, 6 fiber per day, 1 500mg magnesium. Linzess when feeling backed up.   ARBEN 35g benign  Discussed concern for structure vs prostate  "obstruction, and effect of constipation on LUTS/emptying. Declined alpha blocker 2/2 to potential for retrograde ejaculation and desired fertility. Focused on conservative recs and reeval and consider cysto/trus.     Returned to see ALTHEA Perry 9/13/19 with AUA SS 9/2, Uroflow demonstrating blunted sawtooth obstructed intermittent pattern most consistent with structure, having voided 378cc over 118.5 sec with a peak flow rate of 8 cc/second and an average flow rate of 3.3 cc/second with for distinct intervals, each of which are choppy and intermittent. PVR 190cc. Advised cysto/trus but returns today to discuss further  He has seen Dr Montgomery in interim and has EGD/colonoscopy planned and was also advised to take Linzess regularly for his chronic constipation     He presents today noting  Denies intermittency, some weakening of stream  + hesitancy. Feels like he empties.  AUA SS 5/3  PVR by bladder scan today is 0cc.  Has had semenalysis at [facility] with reported low semen volume and abnormality, with concern for a blockage  Has notice some redness at tip of penis but no dysuria, no discharge.     ROS: A comprehensive 10 system review was performed and is negative except as noted above in HPI     PHYSICAL EXAM:         Vitals:     10/07/19 1426   BP: (!) 158/93   Pulse: 63   Resp: 18      Body mass index is 27.51 kg/m². Weight: 75 kg (165 lb 5.5 oz) Height: 5' 5" (165.1 cm)         General: Alert, cooperative, no distress, appears stated age   Head: Normocephalic, without obvious abnormality, atraumatic   Eyes: PERRL, conjunctiva/corneas clear   Lungs: Respirations unlabored   Heart: Warm and well perfused   Abdomen: soft NT ND  : normal phallus thea orthotopic meatus of normal size with stable birthmark/freckle to left of meatus and no significant erythema/redness/inflammation  Extremities: Extremities normal, atraumatic, no cyanosis or edema   Skin: Skin color, texture, turgor normal, no rashes or lesions   Psych: " Appropriate   Neurologic: Non-focal         Recent Results         Recent Results (from the past 336 hour(s))   POCT URINE DIPSTICK WITHOUT MICROSCOPE     Collection Time: 10/07/19  2:54 PM   Result Value Ref Range     Color, UA yellow       Spec Grav UA 1.005       pH, UA 6       WBC, UA neg       Nitrite, UA neg       Protein neg       Glucose, UA neg       Ketones, UA neg       Urobilinogen, UA 0.2       Bilirubin neg       Blood, UA neg              ASSESSMENT   1. Incomplete bladder emptying  POCT URINE DIPSTICK WITHOUT MICROSCOPE     POCT Bladder Scan         Plan    Urinary symptoms have subjectively improved today, and he does demonstrate bladder emptying today with a postvoid residual of 0 cc.  At last office visit he did have a PVR of 190 cc with a uroflow pattern concerning for stricture or obstruction.  He does have some urinary hesitancy but overall his urinary symptoms seem better controlled at this time.  We did have a risk benefit discussion about cystoscopy to evaluate for urethral strictures there was concern previously, and his uroflow pattern is consistent with this.  He is also concerned with fertility and has had reported low volume semen analysis with concern for a blockage.  We did differentiate urethral and urinary tract and ejaculatory and ejaculatory duct obstruction, however it is reasonable that if he does have a significant urethral stricture, that he may not completely ejaculate past this.  Did differentiate the 2 different concerns, and he has had a formal semen analysis at [facility] on fertility which I do not have the results of.  We did discuss that in the case of abnormal semen analysis and workup for fertility, he would need an andrologist or dedicated fertility specialist.  I am able to workup his urinary symptoms and lower tract, and if any blockages present within the urethra could facilitate relief of obstruction and we did briefly discuss management of urethral stricture  disease if found to be present.  As he is relatively asymptomatic today with good bladder emptying, we did have a risk benefit discussion about proceeding with cystoscopy, though he would like to proceed at this time.  Procedure in detail diagnostic flexible cystourethroscopy was discussed in detail with the patient including the use of local anesthesia with xylocaine jelly.  Cystoscopy has been scheduled at the [facility] on 11/12/19  Pt will bring semenalysis results at that time    Pt is acceptable candidate for procedure at [facility]

## 2019-11-13 ENCOUNTER — TELEPHONE (OUTPATIENT)
Dept: GASTROENTEROLOGY | Facility: CLINIC | Age: 39
End: 2019-11-13

## 2019-11-13 NOTE — TELEPHONE ENCOUNTER
----- Message from Brendan Montgomery MD sent at 11/13/2019  4:16 PM CST -----  Routine office follow up next available for constipation      ----- Message -----  From: Darryn Terrell MD  Sent: 11/13/2019   9:32 AM CST  To: Brendan Montgomery MD    Pt not taking linzess due to cost - significant constipation and back up of stool seen cystoscopically

## 2019-11-13 NOTE — OP NOTE
[facility] Urology Operative/Discharge Note    Date: 11/12/19    Staff Surgeon: Darryn Terrell MD    Pre-Op Diagnosis: incomplete bladder emptying, luts    Post-Op Diagnosis: same    Procedure(s) Performed:   Cystoscopy, flexible    Specimen(s): none    Anesthesia: Local anesthesia topical 2% lidocaine gel    Findings: significant posterior impression on bladder wall from colon with peristalsis and multiple stool mound impressions seen, otherwise normal cysto without obstruction    Estimated Blood Loss: none    Drains: none    Complications: none    Indications for procedure:  38 yo M with history of opioid induced chronic constipation and mild LUTS. At follow up of LUTS with NP in 9/19 had obstructed intermittent uroflow pattern and PVR 190cc. Declined alpha blocker given desired fertility to avoid retrograde ejaculation and at my evaluation 10/19 luts improved with aua ss 5/3 and PVR 0cc but given history and concerning uroflow with elevated residuals, planned cystoscopic evaluation for obstruction and to rule out urethral stricture which uroflow pattern was most consistent with. He does report that he has been off his linzess as he in unable to afford it and just taking stool softeners.     Procedure in detail:  After appropriate informed consent, the patient was taken to the cystoscopy suite and placed in the supine position.  He was prepped and draped in standard sterile fashion. 2% xylocaine jelly was instilled per urethra and onto the cystoscope.  A digital flexible cystoscope was passed per urethra into the bladder.  Anterior and posterior urethra appeared normal without any lesions, strictures, or obstruction.  Prostatic urethra was unobstructed.  Verumontanum appeared normal.  The bladder was systematically inspected and found to be free of mucosal lesions or abnormalities.  Bilateral ureteral orifices located in the orthotopic position on the trigone.  He did have significant impressions of the posterior  bladder wall from distended colon.  Bowel peristalsis could be seen with multiple distinct stool mounds making impression on posterior bladder wall.  Patient tolerated procedure well.    Disposition:   As previously discussed with patient, his severe chronic constipation is likely the result of his intermittent difficulty voiding, and we did review the effect of distended bowel and is compressive effect on the bladder. He does have significant stool within the colon as seen making multiple impressions on the bladder wall with extrinsic compression from distended bowel on the bladder.  Cystoscopy was otherwise normal.  There is no visualized obstruction in the prostate or within the urethra.  This is likely why he had no difference in symptoms with alpha blockers in the past.  We discussed the importance of a bowel regimen and I did provide elements of an over-the-counter bowel regimen, however have advised that he follow-up with GI on alternative recommendations and perhaps therapy alternative is to [facility] patient assistance program to help him afford it.   Did again review that there is nothing anatomic within the urethra or urinary tract which would be limiting his fertility potential, and advised that he continued to pursue this with fertility specialists.  Will set routine 6 months urologic follow-up with nurse practitioner to reassess symptoms and emptying, which if stable can follow up on an as-needed basis.  If incomplete emptying persists at that time, status of constipation and bowel habits should be assessed, as if this is still significant problem, would need further GI management.      Discharge home today status post uncomplicated procedure as above  Diet - resume home diet  Follow up: 6-8 mos np guy'ace - appt reminder  to be sent  Instructions: drink water, follow bowel regimen, fu with GI  Meds:     Medication List      START taking these medications    ciprofloxacin HCl 500 MG  tablet  Commonly known as:  CIPRO  Take 1 tablet (500 mg total) by mouth 2 (two) times daily.        CONTINUE taking these medications    baclofen 10 MG tablet  Commonly known as:  LIORESAL     clomiPHENE 50 mg tablet  Commonly known as:  CLOMID  Take 1 tablet (50 mg total) by mouth once daily.     linaCLOtide 72 mcg Cap capsule  Commonly known as:  LINZESS     lisinopril-hydrochlorothiazide 20-12.5 mg per tablet  Commonly known as:  PRINZIDE,ZESTORETIC     meloxicam 15 MG tablet  Commonly known as:  MOBIC     morphine 200 MG 12 hr tablet  Commonly known as:  MS CONTIN     ondansetron 4 MG tablet  Commonly known as:  ZOFRAN     pantoprazole 40 MG tablet  Commonly known as:  PROTONIX  Take 1 tablet (40 mg total) by mouth once daily.     TIZANIDINE ORAL           Where to Get Your Medications      These medications were sent to [facility]/pharmacy #[id number] - [place] - [address]  [address] [address]    Phone:  [phone]   · ciprofloxacin HCl 500 MG tablet

## 2019-11-15 VITALS
HEART RATE: 56 BPM | OXYGEN SATURATION: 19 % | HEIGHT: 65 IN | WEIGHT: 165.38 LBS | RESPIRATION RATE: 19 BRPM | SYSTOLIC BLOOD PRESSURE: 144 MMHG | BODY MASS INDEX: 27.56 KG/M2 | TEMPERATURE: 99 F | DIASTOLIC BLOOD PRESSURE: 86 MMHG

## 2019-12-02 ENCOUNTER — OFFICE VISIT (OUTPATIENT)
Dept: FAMILY MEDICINE | Facility: CLINIC | Age: 39
End: 2019-12-02
Payer: MEDICARE

## 2019-12-02 VITALS
BODY MASS INDEX: 27.48 KG/M2 | WEIGHT: 171 LBS | SYSTOLIC BLOOD PRESSURE: 106 MMHG | HEIGHT: 66 IN | DIASTOLIC BLOOD PRESSURE: 78 MMHG | HEART RATE: 56 BPM

## 2019-12-02 DIAGNOSIS — K59.03 THERAPEUTIC OPIOID INDUCED CONSTIPATION: ICD-10-CM

## 2019-12-02 DIAGNOSIS — T40.2X5A THERAPEUTIC OPIOID INDUCED CONSTIPATION: ICD-10-CM

## 2019-12-02 DIAGNOSIS — D64.9 ANEMIA, UNSPECIFIED TYPE: ICD-10-CM

## 2019-12-02 DIAGNOSIS — K21.9 GASTROESOPHAGEAL REFLUX DISEASE WITHOUT ESOPHAGITIS: ICD-10-CM

## 2019-12-02 DIAGNOSIS — I10 ESSENTIAL HYPERTENSION: Primary | ICD-10-CM

## 2019-12-02 DIAGNOSIS — M51.36 DDD (DEGENERATIVE DISC DISEASE), LUMBAR: ICD-10-CM

## 2019-12-02 PROCEDURE — 3008F PR BODY MASS INDEX (BMI) DOCUMENTED: ICD-10-PCS | Mod: S$GLB,,, | Performed by: NURSE PRACTITIONER

## 2019-12-02 PROCEDURE — 3008F BODY MASS INDEX DOCD: CPT | Mod: S$GLB,,, | Performed by: NURSE PRACTITIONER

## 2019-12-02 PROCEDURE — 99214 OFFICE O/P EST MOD 30 MIN: CPT | Mod: S$GLB,,, | Performed by: NURSE PRACTITIONER

## 2019-12-02 PROCEDURE — 3074F PR MOST RECENT SYSTOLIC BLOOD PRESSURE < 130 MM HG: ICD-10-PCS | Mod: S$GLB,,, | Performed by: NURSE PRACTITIONER

## 2019-12-02 PROCEDURE — 3078F PR MOST RECENT DIASTOLIC BLOOD PRESSURE < 80 MM HG: ICD-10-PCS | Mod: S$GLB,,, | Performed by: NURSE PRACTITIONER

## 2019-12-02 PROCEDURE — 99214 PR OFFICE/OUTPT VISIT, EST, LEVL IV, 30-39 MIN: ICD-10-PCS | Mod: S$GLB,,, | Performed by: NURSE PRACTITIONER

## 2019-12-02 PROCEDURE — 3074F SYST BP LT 130 MM HG: CPT | Mod: S$GLB,,, | Performed by: NURSE PRACTITIONER

## 2019-12-02 PROCEDURE — 3078F DIAST BP <80 MM HG: CPT | Mod: S$GLB,,, | Performed by: NURSE PRACTITIONER

## 2019-12-02 RX ORDER — TIZANIDINE 4 MG/1
TABLET ORAL
COMMUNITY
Start: 2019-11-27

## 2019-12-02 RX ORDER — LISINOPRIL AND HYDROCHLOROTHIAZIDE 12.5; 2 MG/1; MG/1
1 TABLET ORAL DAILY
Qty: 90 TABLET | Refills: 1 | Status: SHIPPED | OUTPATIENT
Start: 2019-12-02

## 2019-12-02 NOTE — PROGRESS NOTES
SUBJECTIVE:    Patient ID: Darryn Wilson is a 39 y.o. male.    Chief Complaint: Follow-up (SW)    Pt here for regular f/u. Reports since last visit had cscope with Dr. Terrell- told bladder looked good and urinary frequency was d/t severe constipation. Reports has been taking linzess 72mcg but it doesn't seem to help that much- also had problems affording linzess.   Reports also had EGD with Dr. Montgomery and told he had HH and mild gastritis-currently on pantoprazole  Still follows regularly with Dr. Storm, pain management        Lab Visit on 10/09/2019   Component Date Value Ref Range Status    WBC 10/09/2019 5.25  3.90 - 12.70 K/uL Final    RBC 10/09/2019 4.14* 4.60 - 6.20 M/uL Final    Hemoglobin 10/09/2019 11.6* 14.0 - 18.0 g/dL Final    Hematocrit 10/09/2019 35.7* 40.0 - 54.0 % Final    Mean Corpuscular Volume 10/09/2019 86  82 - 98 fL Final    Mean Corpuscular Hemoglobin 10/09/2019 28.0  27.0 - 31.0 pg Final    Mean Corpuscular Hemoglobin Conc 10/09/2019 32.5  32.0 - 36.0 g/dL Final    RDW 10/09/2019 11.9  11.5 - 14.5 % Final    Platelets 10/09/2019 192  150 - 350 K/uL Final    MPV 10/09/2019 9.9  9.2 - 12.9 fL Final    Gran # (ANC) 10/09/2019 2.9  1.8 - 7.7 K/uL Final    Immature Grans (Abs) 10/09/2019 0.01  0.00 - 0.04 K/uL Final    Lymph # 10/09/2019 1.9  1.0 - 4.8 K/uL Final    Mono # 10/09/2019 0.3  0.3 - 1.0 K/uL Final    Eos # 10/09/2019 0.1  0.0 - 0.5 K/uL Final    Baso # 10/09/2019 0.04  0.00 - 0.20 K/uL Final    nRBC 10/09/2019 0  0 /100 WBC Final    Gran% 10/09/2019 54.8  38.0 - 73.0 % Final    Lymph% 10/09/2019 36.6  18.0 - 48.0 % Final    Mono% 10/09/2019 5.1  4.0 - 15.0 % Final    Eosinophil% 10/09/2019 2.5  0.0 - 8.0 % Final    Basophil% 10/09/2019 0.8  0.0 - 1.9 % Final    Differential Method 10/09/2019 Automated   Final    Iron 10/09/2019 47  45 - 160 ug/dL Final    Transferrin 10/09/2019 168* 200 - 375 mg/dL Final    TIBC 10/09/2019 249* 250 - 450 ug/dL Final     Saturated Iron 10/09/2019 19* 20 - 50 % Final    Ferritin 10/09/2019 39  20.0 - 300.0 ng/mL Final   Office Visit on 10/07/2019   Component Date Value Ref Range Status    Color, UA 10/07/2019 yellow   Final    Spec Grav UA 10/07/2019 1.005   Final    pH, UA 10/07/2019 6   Final    WBC, UA 10/07/2019 neg   Final    Nitrite, UA 10/07/2019 neg   Final    Protein 10/07/2019 neg   Final    Glucose, UA 10/07/2019 neg   Final    Ketones, UA 10/07/2019 neg   Final    Urobilinogen, UA 10/07/2019 0.2   Final    Bilirubin 10/07/2019 neg   Final    Blood, UA 10/07/2019 neg   Final    POC Residual Urine Volume 10/08/2019 0  0 - 100 mL Final   Clinical Support on 09/21/2019   Component Date Value Ref Range Status    POC Blood, Urine 09/21/2019 Negative  Negative Final    POC Bilirubin, Urine 09/21/2019 Negative  Negative Final    POC Urobilinogen, Urine 09/21/2019 negative  0.3 - 2.2 Final    POC Ketones, Urine 09/21/2019 Negative  Negative Final    POC Protein, Urine 09/21/2019 Negative  Negative Final    POC Nitrates, Urine 09/21/2019 Negative  Negative Final    POC Glucose, Urine 09/21/2019 Negative  Negative Final    pH, UA 09/21/2019 6.0   Final    POC Specific Gravity, Urine 09/21/2019 1.015  1.003 - 1.029 Final    POC Leukocytes, Urine 09/21/2019 Negative  Negative Final    Urine Culture, Routine 09/21/2019 Final report   Final    Result 1 09/21/2019 No growth   Final   Office Visit on 09/12/2019   Component Date Value Ref Range Status    POC Residual Urine Volume 09/12/2019 174* 0 - 100 mL Final       Past Medical History:   Diagnosis Date    Allergy     DDD (degenerative disc disease), lumbosacral     Dysphagia     GERD (gastroesophageal reflux disease)     Hesitancy of micturition     Hypertension     Hypogonadism in male     Uncomplicated opioid dependence      Past Surgical History:   Procedure Laterality Date    BACK SURGERY      CYSTOSCOPY N/A 11/12/2019    Procedure:  CYSTOSCOPY;  Surgeon: Darryn Terrell MD;  Location[facility];  Service: Urology;  Laterality: N/A;    ESOPHAGOGASTRODUODENOSCOPY N/A 10/9/2019    Procedure: EGD (ESOPHAGOGASTRODUODENOSCOPY);  Surgeon: Brendan Montgomery MD;  Location: [facility];  Service: Endoscopy;  Laterality: N/A;    LUMBAR FUSION N/A     spinal cord stimulator and then removed       History reviewed. No pertinent family history.    Marital Status:   Alcohol History:  reports that he drinks alcohol.  Tobacco History:  reports that he has quit smoking. He has never used smokeless tobacco.  Drug History:  has no drug history on file.    Review of patient's allergies indicates:  No Known Allergies    Current Outpatient Medications:     baclofen (LIORESAL) 10 MG tablet, Take by mouth 2 (two) times daily., Disp: , Rfl:     clomiPHENE (CLOMID) 50 mg tablet, Take 1 tablet (50 mg total) by mouth once daily., Disp: 90 tablet, Rfl: 3    linaCLOtide (LINZESS) 145 mcg Cap capsule, Take 1 capsule (145 mcg total) by mouth once daily., Disp: 30 capsule, Rfl: 5    lisinopril-hydrochlorothiazide (PRINZIDE,ZESTORETIC) 20-12.5 mg per tablet, Take 1 tablet by mouth once daily., Disp: 90 tablet, Rfl: 1    meloxicam (MOBIC) 15 MG tablet, Take 15 mg by mouth once daily., Disp: , Rfl:     morphine (MS CONTIN) 200 MG 12 hr tablet, Take 200 mg by mouth 2 (two) times daily., Disp: , Rfl:     ondansetron (ZOFRAN) 4 MG tablet, , Disp: , Rfl:     pantoprazole (PROTONIX) 40 MG tablet, Take 1 tablet (40 mg total) by mouth once daily., Disp: 90 tablet, Rfl: 3    tiZANidine (ZANAFLEX) 4 MG tablet, , Disp: , Rfl:     ciprofloxacin HCl (CIPRO) 500 MG tablet, Take 1 tablet (500 mg total) by mouth 2 (two) times daily., Disp: 4 tablet, Rfl: 0    Review of Systems   Constitutional: Negative for chills and fever.   HENT: Negative for sore throat.    Respiratory: Negative for cough, shortness of breath and wheezing.    Cardiovascular: Negative for chest pain,  "palpitations and leg swelling.   Gastrointestinal: Positive for constipation. Negative for abdominal pain and diarrhea.   Genitourinary: Positive for frequency. Negative for dysuria and hematuria.   Musculoskeletal: Positive for back pain.   Skin: Negative for rash.   Neurological: Negative for dizziness and headaches.   Psychiatric/Behavioral: Negative for dysphoric mood.          Objective:      Vitals:    12/02/19 0940   BP: 106/78   Pulse: (!) 56   Weight: 77.6 kg (171 lb)   Height: 5' 5.5" (1.664 m)     Physical Exam   Constitutional: He is oriented to person, place, and time. He appears well-developed and well-nourished.   HENT:   Head: Normocephalic and atraumatic.   Right Ear: Tympanic membrane and ear canal normal.   Left Ear: Tympanic membrane and ear canal normal.   Mouth/Throat: Mucous membranes are normal. No posterior oropharyngeal erythema.   Neck: Neck supple. Carotid bruit is not present.   Cardiovascular: Normal rate and regular rhythm. Exam reveals no gallop and no friction rub.   No murmur heard.  Pulmonary/Chest: Effort normal and breath sounds normal. No respiratory distress. He has no wheezes. He has no rales.   Abdominal: Soft. He exhibits no distension. There is no tenderness.   Lymphadenopathy:     He has no cervical adenopathy.   Neurological: He is alert and oriented to person, place, and time. He has normal strength. Gait normal.   Skin: Skin is warm and dry. No rash noted.   Psychiatric: He has a normal mood and affect.   Nursing note and vitals reviewed.        Assessment:       1. Essential hypertension    2. DDD (degenerative disc disease), lumbar    3. Therapeutic opioid induced constipation    4. Gastroesophageal reflux disease without esophagitis    5. Anemia, unspecified type           Plan:       Essential hypertension    - lisinopril-hydrochlorothiazide (PRINZIDE,ZESTORETIC) 20-12.5 mg per tablet; Take 1 tablet by mouth once daily.  Dispense: 90 tablet; Refill: 1  -     CBC " auto differential; Future; Expected date: 12/02/2019  -     Comprehensive metabolic panel; Future; Expected date: 12/02/2019  -     Lipid panel; Future; Expected date: 12/02/2019  -     TSH w/reflex to FT4; Future; Expected date: 12/02/2019  -     Urinalysis; Future; Expected date: 12/02/2019  -blood pressure well controlled.  Patient to have updated labs drawn today    DDD (degenerative disc disease), lumbar  -stable/chronic pain continues, follow-up with pain management    Therapeutic opioid induced constipation  -     linaCLOtide (LINZESS) 145 mcg Cap capsule; Take 1 capsule (145 mcg total) by mouth once daily.  Dispense: 30 capsule; Refill: 5  -patient given samples of Linzess 145 mcg to take daily 30 min before breakfast    Gastroesophageal reflux disease without esophagitis  -stable on medication    Anemia  -mild on Dr. Montgomery's labs, will recheck today        Follow up in about 6 months (around 6/2/2020) for Dr. Johnson next visit for HTN, labwork today.        12/2/2019 Varsha Dsouza NP

## 2019-12-03 LAB
ALBUMIN SERPL-MCNC: 4.4 G/DL (ref 3.6–5.1)
ALBUMIN/GLOB SERPL: 2.1 (CALC) (ref 1–2.5)
ALP SERPL-CCNC: 40 U/L (ref 40–115)
ALT SERPL-CCNC: 15 U/L (ref 9–46)
APPEARANCE UR: CLEAR
AST SERPL-CCNC: 19 U/L (ref 10–40)
BASOPHILS # BLD AUTO: 80 CELLS/UL (ref 0–200)
BASOPHILS NFR BLD AUTO: 1.4 %
BILIRUB SERPL-MCNC: 0.3 MG/DL (ref 0.2–1.2)
BILIRUB UR QL STRIP: NEGATIVE
BUN SERPL-MCNC: 15 MG/DL (ref 7–25)
BUN/CREAT SERPL: NORMAL (CALC) (ref 6–22)
CALCIUM SERPL-MCNC: 8.9 MG/DL (ref 8.6–10.3)
CHLORIDE SERPL-SCNC: 105 MMOL/L (ref 98–110)
CHOLEST SERPL-MCNC: 174 MG/DL
CHOLEST/HDLC SERPL: 3.2 (CALC)
CO2 SERPL-SCNC: 31 MMOL/L (ref 20–32)
COLOR UR: YELLOW
CREAT SERPL-MCNC: 0.84 MG/DL (ref 0.6–1.35)
EOSINOPHIL # BLD AUTO: 143 CELLS/UL (ref 15–500)
EOSINOPHIL NFR BLD AUTO: 2.5 %
ERYTHROCYTE [DISTWIDTH] IN BLOOD BY AUTOMATED COUNT: 12.5 % (ref 11–15)
GFRSERPLBLD MDRD-ARVRAT: 111 ML/MIN/1.73M2
GLOBULIN SER CALC-MCNC: 2.1 G/DL (CALC) (ref 1.9–3.7)
GLUCOSE SERPL-MCNC: 95 MG/DL (ref 65–99)
GLUCOSE UR QL STRIP: NEGATIVE
HCT VFR BLD AUTO: 37.6 % (ref 38.5–50)
HDLC SERPL-MCNC: 54 MG/DL
HGB BLD-MCNC: 12.7 G/DL (ref 13.2–17.1)
HGB UR QL STRIP: NEGATIVE
KETONES UR QL STRIP: NEGATIVE
LDLC SERPL CALC-MCNC: 97 MG/DL (CALC)
LEUKOCYTE ESTERASE UR QL STRIP: NEGATIVE
LYMPHOCYTES # BLD AUTO: 2177 CELLS/UL (ref 850–3900)
LYMPHOCYTES NFR BLD AUTO: 38.2 %
MCH RBC QN AUTO: 28.7 PG (ref 27–33)
MCHC RBC AUTO-ENTMCNC: 33.8 G/DL (ref 32–36)
MCV RBC AUTO: 84.9 FL (ref 80–100)
MONOCYTES # BLD AUTO: 428 CELLS/UL (ref 200–950)
MONOCYTES NFR BLD AUTO: 7.5 %
NEUTROPHILS # BLD AUTO: 2873 CELLS/UL (ref 1500–7800)
NEUTROPHILS NFR BLD AUTO: 50.4 %
NITRITE UR QL STRIP: NEGATIVE
NONHDLC SERPL-MCNC: 120 MG/DL (CALC)
PH UR STRIP: 7 [PH] (ref 5–8)
PLATELET # BLD AUTO: 252 THOUSAND/UL (ref 140–400)
PMV BLD REES-ECKER: 10.3 FL (ref 7.5–12.5)
POTASSIUM SERPL-SCNC: 4.4 MMOL/L (ref 3.5–5.3)
PROT SERPL-MCNC: 6.5 G/DL (ref 6.1–8.1)
PROT UR QL STRIP: NEGATIVE
RBC # BLD AUTO: 4.43 MILLION/UL (ref 4.2–5.8)
SODIUM SERPL-SCNC: 142 MMOL/L (ref 135–146)
SP GR UR STRIP: 1.02 (ref 1–1.03)
TRIGL SERPL-MCNC: 131 MG/DL
TSH SERPL-ACNC: 1.84 MIU/L (ref 0.4–4.5)
WBC # BLD AUTO: 5.7 THOUSAND/UL (ref 3.8–10.8)

## 2019-12-05 ENCOUNTER — TELEPHONE (OUTPATIENT)
Dept: FAMILY MEDICINE | Facility: CLINIC | Age: 39
End: 2019-12-05

## 2019-12-05 NOTE — TELEPHONE ENCOUNTER
----- Message from Varsha Dsouza NP sent at 12/4/2019  6:04 PM CST -----  Please let patient know overall his labs look good.  The mild anemia that was seen last month on labs has improved.  Cholesterol, kidney, liver, thyroid, urine all within normal range.

## 2019-12-05 NOTE — PROGRESS NOTES
Please let patient know overall his labs look good.  The mild anemia that was seen last month on labs has improved.  Cholesterol, kidney, liver, thyroid, urine all within normal range.

## 2020-06-04 ENCOUNTER — CLINICAL SUPPORT (OUTPATIENT)
Dept: URGENT CARE | Facility: CLINIC | Age: 40
End: 2020-06-04
Payer: MEDICARE

## 2020-06-04 VITALS
SYSTOLIC BLOOD PRESSURE: 118 MMHG | HEART RATE: 49 BPM | BODY MASS INDEX: 27.16 KG/M2 | TEMPERATURE: 98 F | RESPIRATION RATE: 16 BRPM | WEIGHT: 169 LBS | HEIGHT: 66 IN | DIASTOLIC BLOOD PRESSURE: 74 MMHG | OXYGEN SATURATION: 98 %

## 2020-06-04 DIAGNOSIS — S00.411A ABRASION OF RIGHT EAR, INITIAL ENCOUNTER: Primary | ICD-10-CM

## 2020-06-04 PROCEDURE — 99214 PR OFFICE/OUTPT VISIT, EST, LEVL IV, 30-39 MIN: ICD-10-PCS | Mod: S$GLB,,, | Performed by: NURSE PRACTITIONER

## 2020-06-04 PROCEDURE — 99214 OFFICE O/P EST MOD 30 MIN: CPT | Mod: S$GLB,,, | Performed by: NURSE PRACTITIONER

## 2020-06-04 RX ORDER — NEOMYCIN SULFATE, POLYMYXIN B SULFATE, HYDROCORTISONE 3.5; 10000; 1 MG/ML; [USP'U]/ML; MG/ML
4 SOLUTION/ DROPS AURICULAR (OTIC) 3 TIMES DAILY
Qty: 10 ML | Refills: 0 | Status: SHIPPED | OUTPATIENT
Start: 2020-06-04 | End: 2020-06-14

## 2020-06-04 NOTE — PATIENT INSTRUCTIONS
Abrasions  Abrasions are skin scrapes. Their treatment depends on how large and deep the abrasion is.  Home care  You may be prescribed an antibiotic cream or ointment to apply to the wound. This helps prevent infection. Follow instructions when using this medicine.  General care  · To care for the abrasion, do the following each day for as long as directed by your healthcare provider.  ¨ If you were given a bandage, change it once a day. If your bandage sticks to the wound, soak it in warm water until it loosens.  ¨ Wash the area with soap and warm water. You may do this in a sink or under a tub faucet or shower. Rinse off the soap. Then pat the area dry with a clean towel.  ¨ If antibiotic ointment or cream was prescribed, reapply it to the wound as directed. Cover the wound with a fresh nonstick bandage. If the bandage becomes wet or dirty, change it as soon as possible.  ¨ Some antibiotic ointments or cream can cause an allergic reaction or dermatitis. This may cause redness, itching and or hives. If this occurs, stop using the ointment immediately and wash off any remaining ointment. You may need to take some allergy medicine to relieve symptoms.  · You may use acetaminophen or ibuprofen to control pain unless another pain medicine was prescribed. Talk with your healthcare provider before using these medicines if you have chronic liver or kidney disease or ever had a stomach ulcer or GI bleeding. Dont use ibuprofen in children younger than six months old.  · Most skin wounds heal within 10 days. But an infection may occur even with treatment. So its important to watch the wound for signs of infection as listed below.  Follow-up care  Follow up with your healthcare provider, or as advised.  When to seek medical advice  Call your healthcare provider right away if any of these occur:  · Fever of 100.4ºF (38ºC) or higher, or as directed by your healthcare provider  · Increasing pain, redness, swelling, or  drainage from the wound  · Bleeding from the wound that does not stop after a few minutes of steady, firm pressure  · Decreased ability to move any body part near the wound  Date Last Reviewed: 3/3/2017  © 2418-4182 [employer]. [address] [place] [address], [place] [address]. All rights reserved. This information is not intended as a substitute for professional medical care. Always follow your healthcare professional's instructions.

## 2020-06-04 NOTE — PROGRESS NOTES
"Subjective:       Patient ID: Darryn Wilson is a 39 y.o. male.    Vitals:  height is 5' 5.5" (1.664 m) and weight is 76.7 kg (169 lb). His oral temperature is 98.2 °F (36.8 °C). His blood pressure is 118/74 and his pulse is 49 (abnormal). His respiration is 16 and oxygen saturation is 98%.     Chief Complaint: Ear Problem    Patient reports blood started coming out of his right ear after cleaning his ear with a q-tip this morning. Denies ear pain or changes in hearing.       Constitution: Negative for chills, fatigue and fever.   HENT: Negative for congestion and sore throat.    Neck: Negative for painful lymph nodes.   Cardiovascular: Negative for chest pain and leg swelling.   Eyes: Negative for double vision and blurred vision.   Respiratory: Negative for cough and shortness of breath.    Gastrointestinal: Negative for nausea, vomiting and diarrhea.   Genitourinary: Negative for dysuria, frequency and urgency.   Musculoskeletal: Negative for joint pain, joint swelling, muscle cramps and muscle ache.   Skin: Negative for color change, pale and rash.   Allergic/Immunologic: Negative for seasonal allergies.   Neurological: Negative for dizziness, history of vertigo, light-headedness, passing out and headaches.   Hematologic/Lymphatic: Negative for swollen lymph nodes, easy bruising/bleeding and history of blood clots. Does not bruise/bleed easily.   Psychiatric/Behavioral: Negative for nervous/anxious, sleep disturbance and depression. The patient is not nervous/anxious.        Objective:      Physical Exam   Constitutional: He is oriented to person, place, and time. Vital signs are normal. He appears well-developed and well-nourished. He is cooperative.  Non-toxic appearance. He does not have a sickly appearance. He does not appear ill. No distress.   HENT:   Head: Normocephalic and atraumatic.   Right Ear: Hearing, tympanic membrane and external ear normal. Tympanic membrane is not perforated, not " erythematous, not retracted and not bulging.   Left Ear: Hearing, external ear and ear canal normal. Tympanic membrane is not bulging.   Nose: Nose normal. No mucosal edema, rhinorrhea or nasal deformity. No epistaxis. Right sinus exhibits no maxillary sinus tenderness and no frontal sinus tenderness. Left sinus exhibits no maxillary sinus tenderness and no frontal sinus tenderness.   Mouth/Throat: Uvula is midline, oropharynx is clear and moist and mucous membranes are normal. No trismus in the jaw. Normal dentition. No uvula swelling. No posterior oropharyngeal erythema.   Blood noted to right ear canal with small abrasion. TM intact and with out perforation.    Eyes: Conjunctivae and lids are normal. Right eye exhibits no discharge. Left eye exhibits no discharge. No scleral icterus.   Neck: Trachea normal, normal range of motion, full passive range of motion without pain and phonation normal. Neck supple.   Cardiovascular: Normal rate, regular rhythm, normal heart sounds, intact distal pulses and normal pulses.   Pulmonary/Chest: Effort normal and breath sounds normal. No respiratory distress.   Abdominal: Soft. Normal appearance and bowel sounds are normal. He exhibits no distension, no pulsatile midline mass and no mass. There is no tenderness.   Musculoskeletal: Normal range of motion. He exhibits no edema or deformity.   Neurological: He is alert and oriented to person, place, and time. He exhibits normal muscle tone. Coordination normal. GCS eye subscore is 4. GCS verbal subscore is 5. GCS motor subscore is 6.   Skin: Skin is warm, dry, intact, not diaphoretic and not pale.   Psychiatric: He has a normal mood and affect. His speech is normal and behavior is normal. Judgment and thought content normal. Cognition and memory are normal.   Nursing note and vitals reviewed.        Assessment:       1. Abrasion of right ear, initial encounter        Plan:       Patient is with being discharged home. Discussed  reasons to return and importance of followup. All questions addressed and patient given discharge instructions and followup information.    Abrasion of right ear, initial encounter    Other orders  -     neomycin-polymyxin-hydrocortisone (CORTISPORIN) otic solution; Place 4 drops into the right ear 3 (three) times daily. for 10 days  Dispense: 10 mL; Refill: 0

## 2021-10-07 ENCOUNTER — OFFICE VISIT (OUTPATIENT)
Dept: URGENT CARE | Facility: CLINIC | Age: 41
End: 2021-10-07
Payer: MEDICARE

## 2021-10-07 VITALS
BODY MASS INDEX: 29.82 KG/M2 | SYSTOLIC BLOOD PRESSURE: 149 MMHG | OXYGEN SATURATION: 98 % | DIASTOLIC BLOOD PRESSURE: 86 MMHG | TEMPERATURE: 98 F | RESPIRATION RATE: 16 BRPM | HEART RATE: 52 BPM | HEIGHT: 65 IN | WEIGHT: 179 LBS

## 2021-10-07 DIAGNOSIS — K64.4 EXTERNAL HEMORRHOIDS: Primary | ICD-10-CM

## 2021-10-07 PROCEDURE — 3008F PR BODY MASS INDEX (BMI) DOCUMENTED: ICD-10-PCS | Mod: CPTII,S$GLB,, | Performed by: NURSE PRACTITIONER

## 2021-10-07 PROCEDURE — 3079F PR MOST RECENT DIASTOLIC BLOOD PRESSURE 80-89 MM HG: ICD-10-PCS | Mod: CPTII,S$GLB,, | Performed by: NURSE PRACTITIONER

## 2021-10-07 PROCEDURE — 3079F DIAST BP 80-89 MM HG: CPT | Mod: CPTII,S$GLB,, | Performed by: NURSE PRACTITIONER

## 2021-10-07 PROCEDURE — 1159F MED LIST DOCD IN RCRD: CPT | Mod: CPTII,S$GLB,, | Performed by: NURSE PRACTITIONER

## 2021-10-07 PROCEDURE — 1159F PR MEDICATION LIST DOCUMENTED IN MEDICAL RECORD: ICD-10-PCS | Mod: CPTII,S$GLB,, | Performed by: NURSE PRACTITIONER

## 2021-10-07 PROCEDURE — 3077F PR MOST RECENT SYSTOLIC BLOOD PRESSURE >= 140 MM HG: ICD-10-PCS | Mod: CPTII,S$GLB,, | Performed by: NURSE PRACTITIONER

## 2021-10-07 PROCEDURE — 99213 OFFICE O/P EST LOW 20 MIN: CPT | Mod: S$GLB,,, | Performed by: NURSE PRACTITIONER

## 2021-10-07 PROCEDURE — 3077F SYST BP >= 140 MM HG: CPT | Mod: CPTII,S$GLB,, | Performed by: NURSE PRACTITIONER

## 2021-10-07 PROCEDURE — 99213 PR OFFICE/OUTPT VISIT, EST, LEVL III, 20-29 MIN: ICD-10-PCS | Mod: S$GLB,,, | Performed by: NURSE PRACTITIONER

## 2021-10-07 PROCEDURE — 1160F PR REVIEW ALL MEDS BY PRESCRIBER/CLIN PHARMACIST DOCUMENTED: ICD-10-PCS | Mod: CPTII,S$GLB,, | Performed by: NURSE PRACTITIONER

## 2021-10-07 PROCEDURE — 3008F BODY MASS INDEX DOCD: CPT | Mod: CPTII,S$GLB,, | Performed by: NURSE PRACTITIONER

## 2021-10-07 PROCEDURE — 1160F RVW MEDS BY RX/DR IN RCRD: CPT | Mod: CPTII,S$GLB,, | Performed by: NURSE PRACTITIONER

## 2021-10-07 RX ORDER — DOCUSATE SODIUM 100 MG/1
100 CAPSULE, LIQUID FILLED ORAL 2 TIMES DAILY
Qty: 20 CAPSULE | Refills: 0 | Status: SHIPPED | OUTPATIENT
Start: 2021-10-07

## 2021-10-07 RX ORDER — HYDROCORTISONE ACETATE PRAMOXINE HCL 1; 1 G/100G; G/100G
CREAM TOPICAL 2 TIMES DAILY
Qty: 58.4 G | Refills: 0 | Status: SHIPPED | OUTPATIENT
Start: 2021-10-07 | End: 2021-10-14

## 2021-10-08 ENCOUNTER — TELEPHONE (OUTPATIENT)
Dept: GASTROENTEROLOGY | Facility: CLINIC | Age: 41
End: 2021-10-08

## 2021-10-11 ENCOUNTER — OFFICE VISIT (OUTPATIENT)
Dept: SURGERY | Facility: CLINIC | Age: 41
End: 2021-10-11
Payer: MEDICARE

## 2021-10-11 ENCOUNTER — LAB VISIT (OUTPATIENT)
Dept: PRIMARY CARE CLINIC | Facility: CLINIC | Age: 41
End: 2021-10-11
Payer: MEDICARE

## 2021-10-11 VITALS
BODY MASS INDEX: 30.08 KG/M2 | TEMPERATURE: 98 F | WEIGHT: 180.56 LBS | HEART RATE: 66 BPM | DIASTOLIC BLOOD PRESSURE: 87 MMHG | HEIGHT: 65 IN | SYSTOLIC BLOOD PRESSURE: 143 MMHG

## 2021-10-11 DIAGNOSIS — Z01.818 PRE-OP TESTING: ICD-10-CM

## 2021-10-11 DIAGNOSIS — K64.5 HEMORRHOIDS, EXTERNAL, THROMBOSED: Primary | ICD-10-CM

## 2021-10-11 DIAGNOSIS — L91.8 SKIN TAG OF EAR: ICD-10-CM

## 2021-10-11 PROCEDURE — 3008F BODY MASS INDEX DOCD: CPT | Mod: CPTII,S$GLB,, | Performed by: STUDENT IN AN ORGANIZED HEALTH CARE EDUCATION/TRAINING PROGRAM

## 2021-10-11 PROCEDURE — 1159F PR MEDICATION LIST DOCUMENTED IN MEDICAL RECORD: ICD-10-PCS | Mod: CPTII,S$GLB,, | Performed by: STUDENT IN AN ORGANIZED HEALTH CARE EDUCATION/TRAINING PROGRAM

## 2021-10-11 PROCEDURE — 3077F SYST BP >= 140 MM HG: CPT | Mod: CPTII,S$GLB,, | Performed by: STUDENT IN AN ORGANIZED HEALTH CARE EDUCATION/TRAINING PROGRAM

## 2021-10-11 PROCEDURE — 3079F DIAST BP 80-89 MM HG: CPT | Mod: CPTII,S$GLB,, | Performed by: STUDENT IN AN ORGANIZED HEALTH CARE EDUCATION/TRAINING PROGRAM

## 2021-10-11 PROCEDURE — 99999 PR PBB SHADOW E&M-EST. PATIENT-LVL IV: ICD-10-PCS | Mod: PBBFAC,,, | Performed by: STUDENT IN AN ORGANIZED HEALTH CARE EDUCATION/TRAINING PROGRAM

## 2021-10-11 PROCEDURE — 99999 PR PBB SHADOW E&M-EST. PATIENT-LVL IV: CPT | Mod: PBBFAC,,, | Performed by: STUDENT IN AN ORGANIZED HEALTH CARE EDUCATION/TRAINING PROGRAM

## 2021-10-11 PROCEDURE — U0003 INFECTIOUS AGENT DETECTION BY NUCLEIC ACID (DNA OR RNA); SEVERE ACUTE RESPIRATORY SYNDROME CORONAVIRUS 2 (SARS-COV-2) (CORONAVIRUS DISEASE [COVID-19]), AMPLIFIED PROBE TECHNIQUE, MAKING USE OF HIGH THROUGHPUT TECHNOLOGIES AS DESCRIBED BY CMS-2020-01-R: HCPCS | Performed by: STUDENT IN AN ORGANIZED HEALTH CARE EDUCATION/TRAINING PROGRAM

## 2021-10-11 PROCEDURE — 3008F PR BODY MASS INDEX (BMI) DOCUMENTED: ICD-10-PCS | Mod: CPTII,S$GLB,, | Performed by: STUDENT IN AN ORGANIZED HEALTH CARE EDUCATION/TRAINING PROGRAM

## 2021-10-11 PROCEDURE — 3077F PR MOST RECENT SYSTOLIC BLOOD PRESSURE >= 140 MM HG: ICD-10-PCS | Mod: CPTII,S$GLB,, | Performed by: STUDENT IN AN ORGANIZED HEALTH CARE EDUCATION/TRAINING PROGRAM

## 2021-10-11 PROCEDURE — 3079F PR MOST RECENT DIASTOLIC BLOOD PRESSURE 80-89 MM HG: ICD-10-PCS | Mod: CPTII,S$GLB,, | Performed by: STUDENT IN AN ORGANIZED HEALTH CARE EDUCATION/TRAINING PROGRAM

## 2021-10-11 PROCEDURE — U0005 INFEC AGEN DETEC AMPLI PROBE: HCPCS | Performed by: STUDENT IN AN ORGANIZED HEALTH CARE EDUCATION/TRAINING PROGRAM

## 2021-10-11 PROCEDURE — 99204 OFFICE O/P NEW MOD 45 MIN: CPT | Mod: S$GLB,,, | Performed by: STUDENT IN AN ORGANIZED HEALTH CARE EDUCATION/TRAINING PROGRAM

## 2021-10-11 PROCEDURE — 1159F MED LIST DOCD IN RCRD: CPT | Mod: CPTII,S$GLB,, | Performed by: STUDENT IN AN ORGANIZED HEALTH CARE EDUCATION/TRAINING PROGRAM

## 2021-10-11 PROCEDURE — 99204 PR OFFICE/OUTPT VISIT, NEW, LEVL IV, 45-59 MIN: ICD-10-PCS | Mod: S$GLB,,, | Performed by: STUDENT IN AN ORGANIZED HEALTH CARE EDUCATION/TRAINING PROGRAM

## 2021-10-11 RX ORDER — SODIUM CHLORIDE 9 MG/ML
INJECTION, SOLUTION INTRAVENOUS CONTINUOUS
Status: CANCELLED | OUTPATIENT
Start: 2021-10-13

## 2021-10-12 ENCOUNTER — ANESTHESIA EVENT (OUTPATIENT)
Dept: SURGERY | Facility: HOSPITAL | Age: 41
End: 2021-10-12
Payer: MEDICARE

## 2021-10-12 LAB
SARS-COV-2 RNA RESP QL NAA+PROBE: NOT DETECTED
SARS-COV-2- CYCLE NUMBER: NORMAL

## 2021-10-12 RX ORDER — FENTANYL CITRATE 50 UG/ML
25 INJECTION, SOLUTION INTRAMUSCULAR; INTRAVENOUS EVERY 5 MIN PRN
Status: CANCELLED | OUTPATIENT
Start: 2021-10-12

## 2021-10-12 RX ORDER — OXYCODONE HYDROCHLORIDE 5 MG/1
5 TABLET ORAL
Status: CANCELLED | OUTPATIENT
Start: 2021-10-12

## 2021-10-12 RX ORDER — HYDROMORPHONE HYDROCHLORIDE 2 MG/ML
0.2 INJECTION, SOLUTION INTRAMUSCULAR; INTRAVENOUS; SUBCUTANEOUS EVERY 5 MIN PRN
Status: CANCELLED | OUTPATIENT
Start: 2021-10-12

## 2021-10-13 ENCOUNTER — ANESTHESIA (OUTPATIENT)
Dept: SURGERY | Facility: HOSPITAL | Age: 41
End: 2021-10-13
Payer: MEDICARE

## 2021-10-13 ENCOUNTER — HOSPITAL ENCOUNTER (OUTPATIENT)
Facility: HOSPITAL | Age: 41
Discharge: HOME | End: 2021-10-13
Attending: STUDENT IN AN ORGANIZED HEALTH CARE EDUCATION/TRAINING PROGRAM | Admitting: STUDENT IN AN ORGANIZED HEALTH CARE EDUCATION/TRAINING PROGRAM
Payer: MEDICARE

## 2021-10-13 DIAGNOSIS — K64.5 HEMORRHOIDS, EXTERNAL, THROMBOSED: Primary | ICD-10-CM

## 2021-10-13 DIAGNOSIS — Z01.818 PREOP TESTING: ICD-10-CM

## 2021-10-13 DIAGNOSIS — L91.8 SKIN TAG OF EAR: ICD-10-CM

## 2021-10-13 DIAGNOSIS — Z01.818 PRE-OP TESTING: ICD-10-CM

## 2021-10-13 LAB
ALBUMIN SERPL BCP-MCNC: 4.1 G/DL (ref 3.5–5.2)
ALP SERPL-CCNC: 46 U/L (ref 55–135)
ALT SERPL W/O P-5'-P-CCNC: 23 U/L (ref 10–44)
ANION GAP SERPL CALC-SCNC: 9 MMOL/L (ref 8–16)
AST SERPL-CCNC: 20 U/L (ref 10–40)
BASOPHILS # BLD AUTO: 0.06 K/UL (ref 0–0.2)
BASOPHILS NFR BLD: 0.9 % (ref 0–1.9)
BILIRUB SERPL-MCNC: 0.3 MG/DL (ref 0.1–1)
BUN SERPL-MCNC: 12 MG/DL (ref 6–20)
CALCIUM SERPL-MCNC: 9.1 MG/DL (ref 8.7–10.5)
CHLORIDE SERPL-SCNC: 105 MMOL/L (ref 95–110)
CO2 SERPL-SCNC: 29 MMOL/L (ref 23–29)
CREAT SERPL-MCNC: 0.8 MG/DL (ref 0.5–1.4)
DIFFERENTIAL METHOD: ABNORMAL
EOSINOPHIL # BLD AUTO: 0.2 K/UL (ref 0–0.5)
EOSINOPHIL NFR BLD: 2.8 % (ref 0–8)
ERYTHROCYTE [DISTWIDTH] IN BLOOD BY AUTOMATED COUNT: 11.2 % (ref 11.5–14.5)
EST. GFR  (AFRICAN AMERICAN): >60 ML/MIN/1.73 M^2
EST. GFR  (NON AFRICAN AMERICAN): >60 ML/MIN/1.73 M^2
GLUCOSE SERPL-MCNC: 96 MG/DL (ref 70–110)
HCT VFR BLD AUTO: 40 % (ref 40–54)
HGB BLD-MCNC: 13.8 G/DL (ref 14–18)
IMM GRANULOCYTES # BLD AUTO: 0.01 K/UL (ref 0–0.04)
IMM GRANULOCYTES NFR BLD AUTO: 0.1 % (ref 0–0.5)
LYMPHOCYTES # BLD AUTO: 3.2 K/UL (ref 1–4.8)
LYMPHOCYTES NFR BLD: 46.7 % (ref 18–48)
MCH RBC QN AUTO: 29.4 PG (ref 27–31)
MCHC RBC AUTO-ENTMCNC: 34.5 G/DL (ref 32–36)
MCV RBC AUTO: 85 FL (ref 82–98)
MONOCYTES # BLD AUTO: 0.5 K/UL (ref 0.3–1)
MONOCYTES NFR BLD: 6.6 % (ref 4–15)
NEUTROPHILS # BLD AUTO: 3 K/UL (ref 1.8–7.7)
NEUTROPHILS NFR BLD: 42.9 % (ref 38–73)
NRBC BLD-RTO: 0 /100 WBC
PLATELET # BLD AUTO: 232 K/UL (ref 150–450)
PMV BLD AUTO: 9.6 FL (ref 9.2–12.9)
POTASSIUM SERPL-SCNC: 3.9 MMOL/L (ref 3.5–5.1)
PROT SERPL-MCNC: 6.7 G/DL (ref 6–8.4)
RBC # BLD AUTO: 4.7 M/UL (ref 4.6–6.2)
SODIUM SERPL-SCNC: 143 MMOL/L (ref 136–145)
WBC # BLD AUTO: 6.87 K/UL (ref 3.9–12.7)

## 2021-10-13 PROCEDURE — 46083 INC THROMBOSED HROID XTRNL: CPT | Mod: ,,, | Performed by: STUDENT IN AN ORGANIZED HEALTH CARE EDUCATION/TRAINING PROGRAM

## 2021-10-13 PROCEDURE — 37000009 HC ANESTHESIA EA ADD 15 MINS: Performed by: STUDENT IN AN ORGANIZED HEALTH CARE EDUCATION/TRAINING PROGRAM

## 2021-10-13 PROCEDURE — 99900104 DSU ONLY-NO CHARGE-EA ADD'L HR (STAT): Performed by: STUDENT IN AN ORGANIZED HEALTH CARE EDUCATION/TRAINING PROGRAM

## 2021-10-13 PROCEDURE — 88305 TISSUE EXAM BY PATHOLOGIST: CPT | Mod: 26,,, | Performed by: PATHOLOGY

## 2021-10-13 PROCEDURE — 25000003 PHARM REV CODE 250: Performed by: STUDENT IN AN ORGANIZED HEALTH CARE EDUCATION/TRAINING PROGRAM

## 2021-10-13 PROCEDURE — 93010 ELECTROCARDIOGRAM REPORT: CPT | Mod: ,,, | Performed by: GENERAL PRACTICE

## 2021-10-13 PROCEDURE — 25000003 PHARM REV CODE 250: Performed by: ANESTHESIOLOGY

## 2021-10-13 PROCEDURE — 93005 ELECTROCARDIOGRAM TRACING: CPT

## 2021-10-13 PROCEDURE — 63600175 PHARM REV CODE 636 W HCPCS: Performed by: STUDENT IN AN ORGANIZED HEALTH CARE EDUCATION/TRAINING PROGRAM

## 2021-10-13 PROCEDURE — 93010 EKG 12-LEAD: ICD-10-PCS | Mod: ,,, | Performed by: GENERAL PRACTICE

## 2021-10-13 PROCEDURE — 71000015 HC POSTOP RECOV 1ST HR: Performed by: STUDENT IN AN ORGANIZED HEALTH CARE EDUCATION/TRAINING PROGRAM

## 2021-10-13 PROCEDURE — 63600175 PHARM REV CODE 636 W HCPCS: Performed by: NURSE ANESTHETIST, CERTIFIED REGISTERED

## 2021-10-13 PROCEDURE — 36415 COLL VENOUS BLD VENIPUNCTURE: CPT | Performed by: STUDENT IN AN ORGANIZED HEALTH CARE EDUCATION/TRAINING PROGRAM

## 2021-10-13 PROCEDURE — 46083 PR INCISE EXTERNAL HEMORRHOID: ICD-10-PCS | Mod: ,,, | Performed by: STUDENT IN AN ORGANIZED HEALTH CARE EDUCATION/TRAINING PROGRAM

## 2021-10-13 PROCEDURE — 36000704 HC OR TIME LEV I 1ST 15 MIN: Performed by: STUDENT IN AN ORGANIZED HEALTH CARE EDUCATION/TRAINING PROGRAM

## 2021-10-13 PROCEDURE — D9220A PRA ANESTHESIA: ICD-10-PCS | Mod: ANES,,, | Performed by: ANESTHESIOLOGY

## 2021-10-13 PROCEDURE — D9220A PRA ANESTHESIA: Mod: CRNA,,, | Performed by: NURSE ANESTHETIST, CERTIFIED REGISTERED

## 2021-10-13 PROCEDURE — 11200 RMVL SKIN TAGS UP TO&INC 15: CPT | Mod: 51,,, | Performed by: STUDENT IN AN ORGANIZED HEALTH CARE EDUCATION/TRAINING PROGRAM

## 2021-10-13 PROCEDURE — 71000033 HC RECOVERY, INTIAL HOUR: Performed by: STUDENT IN AN ORGANIZED HEALTH CARE EDUCATION/TRAINING PROGRAM

## 2021-10-13 PROCEDURE — 85025 COMPLETE CBC W/AUTO DIFF WBC: CPT | Performed by: STUDENT IN AN ORGANIZED HEALTH CARE EDUCATION/TRAINING PROGRAM

## 2021-10-13 PROCEDURE — 80053 COMPREHEN METABOLIC PANEL: CPT | Performed by: STUDENT IN AN ORGANIZED HEALTH CARE EDUCATION/TRAINING PROGRAM

## 2021-10-13 PROCEDURE — 25000003 PHARM REV CODE 250: Performed by: NURSE ANESTHETIST, CERTIFIED REGISTERED

## 2021-10-13 PROCEDURE — 63600175 PHARM REV CODE 636 W HCPCS: Performed by: ANESTHESIOLOGY

## 2021-10-13 PROCEDURE — 88305 TISSUE EXAM BY PATHOLOGIST: ICD-10-PCS | Mod: 26,,, | Performed by: PATHOLOGY

## 2021-10-13 PROCEDURE — D9220A PRA ANESTHESIA: Mod: ANES,,, | Performed by: ANESTHESIOLOGY

## 2021-10-13 PROCEDURE — 71000039 HC RECOVERY, EACH ADD'L HOUR: Performed by: STUDENT IN AN ORGANIZED HEALTH CARE EDUCATION/TRAINING PROGRAM

## 2021-10-13 PROCEDURE — 11200 PR REMOVAL OF SKIN TAGS, UP TO 15: ICD-10-PCS | Mod: 51,,, | Performed by: STUDENT IN AN ORGANIZED HEALTH CARE EDUCATION/TRAINING PROGRAM

## 2021-10-13 PROCEDURE — 99900103 DSU ONLY-NO CHARGE-INITIAL HR (STAT): Performed by: STUDENT IN AN ORGANIZED HEALTH CARE EDUCATION/TRAINING PROGRAM

## 2021-10-13 PROCEDURE — 88305 TISSUE EXAM BY PATHOLOGIST: CPT | Performed by: PATHOLOGY

## 2021-10-13 PROCEDURE — 36000705 HC OR TIME LEV I EA ADD 15 MIN: Performed by: STUDENT IN AN ORGANIZED HEALTH CARE EDUCATION/TRAINING PROGRAM

## 2021-10-13 PROCEDURE — D9220A PRA ANESTHESIA: ICD-10-PCS | Mod: CRNA,,, | Performed by: NURSE ANESTHETIST, CERTIFIED REGISTERED

## 2021-10-13 PROCEDURE — 37000008 HC ANESTHESIA 1ST 15 MINUTES: Performed by: STUDENT IN AN ORGANIZED HEALTH CARE EDUCATION/TRAINING PROGRAM

## 2021-10-13 RX ORDER — FENTANYL CITRATE 50 UG/ML
25 INJECTION, SOLUTION INTRAMUSCULAR; INTRAVENOUS EVERY 5 MIN PRN
Status: COMPLETED | OUTPATIENT
Start: 2021-10-13 | End: 2021-10-13

## 2021-10-13 RX ORDER — SUCCINYLCHOLINE CHLORIDE 20 MG/ML
INJECTION INTRAMUSCULAR; INTRAVENOUS
Status: DISCONTINUED | OUTPATIENT
Start: 2021-10-13 | End: 2021-10-13

## 2021-10-13 RX ORDER — CEFAZOLIN SODIUM 2 G/50ML
2 SOLUTION INTRAVENOUS
Status: COMPLETED | OUTPATIENT
Start: 2021-10-13 | End: 2021-10-13

## 2021-10-13 RX ORDER — PROPOFOL 10 MG/ML
VIAL (ML) INTRAVENOUS
Status: DISCONTINUED | OUTPATIENT
Start: 2021-10-13 | End: 2021-10-13

## 2021-10-13 RX ORDER — OXYCODONE HYDROCHLORIDE 10 MG/1
20 TABLET ORAL ONCE AS NEEDED
Status: COMPLETED | OUTPATIENT
Start: 2021-10-13 | End: 2021-10-13

## 2021-10-13 RX ORDER — FENTANYL CITRATE 50 UG/ML
INJECTION, SOLUTION INTRAMUSCULAR; INTRAVENOUS
Status: DISCONTINUED | OUTPATIENT
Start: 2021-10-13 | End: 2021-10-13

## 2021-10-13 RX ORDER — KETOROLAC TROMETHAMINE 30 MG/ML
INJECTION, SOLUTION INTRAMUSCULAR; INTRAVENOUS
Status: DISCONTINUED | OUTPATIENT
Start: 2021-10-13 | End: 2021-10-13

## 2021-10-13 RX ORDER — ROCURONIUM BROMIDE 10 MG/ML
INJECTION, SOLUTION INTRAVENOUS
Status: DISCONTINUED | OUTPATIENT
Start: 2021-10-13 | End: 2021-10-13

## 2021-10-13 RX ORDER — MIDAZOLAM HYDROCHLORIDE 1 MG/ML
INJECTION, SOLUTION INTRAMUSCULAR; INTRAVENOUS
Status: DISCONTINUED | OUTPATIENT
Start: 2021-10-13 | End: 2021-10-13

## 2021-10-13 RX ORDER — LIDOCAINE HYDROCHLORIDE 10 MG/ML
1 INJECTION, SOLUTION EPIDURAL; INFILTRATION; INTRACAUDAL; PERINEURAL ONCE
Status: DISCONTINUED | OUTPATIENT
Start: 2021-10-13 | End: 2021-10-13 | Stop reason: HOSPADM

## 2021-10-13 RX ORDER — DEXAMETHASONE SODIUM PHOSPHATE 4 MG/ML
INJECTION, SOLUTION INTRA-ARTICULAR; INTRALESIONAL; INTRAMUSCULAR; INTRAVENOUS; SOFT TISSUE
Status: DISCONTINUED | OUTPATIENT
Start: 2021-10-13 | End: 2021-10-13

## 2021-10-13 RX ORDER — KETAMINE HYDROCHLORIDE 100 MG/ML
INJECTION, SOLUTION INTRAMUSCULAR; INTRAVENOUS
Status: DISCONTINUED | OUTPATIENT
Start: 2021-10-13 | End: 2021-10-13

## 2021-10-13 RX ORDER — BUPIVACAINE HYDROCHLORIDE AND EPINEPHRINE 2.5; 5 MG/ML; UG/ML
INJECTION, SOLUTION EPIDURAL; INFILTRATION; INTRACAUDAL; PERINEURAL
Status: DISCONTINUED | OUTPATIENT
Start: 2021-10-13 | End: 2021-10-13 | Stop reason: HOSPADM

## 2021-10-13 RX ORDER — ONDANSETRON 2 MG/ML
INJECTION INTRAMUSCULAR; INTRAVENOUS
Status: DISCONTINUED | OUTPATIENT
Start: 2021-10-13 | End: 2021-10-13

## 2021-10-13 RX ADMIN — KETAMINE HYDROCHLORIDE 50 MG: 100 INJECTION, SOLUTION, CONCENTRATE INTRAMUSCULAR; INTRAVENOUS at 10:10

## 2021-10-13 RX ADMIN — SODIUM CHLORIDE, SODIUM GLUCONATE, SODIUM ACETATE, POTASSIUM CHLORIDE, MAGNESIUM CHLORIDE, SODIUM PHOSPHATE, DIBASIC, AND POTASSIUM PHOSPHATE: .53; .5; .37; .037; .03; .012; .00082 INJECTION, SOLUTION INTRAVENOUS at 10:10

## 2021-10-13 RX ADMIN — MIDAZOLAM 2 MG: 1 INJECTION INTRAMUSCULAR; INTRAVENOUS at 09:10

## 2021-10-13 RX ADMIN — OXYCODONE HYDROCHLORIDE 20 MG: 10 TABLET ORAL at 10:10

## 2021-10-13 RX ADMIN — FENTANYL CITRATE 25 MCG: 50 INJECTION INTRAMUSCULAR; INTRAVENOUS at 10:10

## 2021-10-13 RX ADMIN — DEXAMETHASONE SODIUM PHOSPHATE 8 MG: 4 INJECTION, SOLUTION INTRA-ARTICULAR; INTRALESIONAL; INTRAMUSCULAR; INTRAVENOUS; SOFT TISSUE at 09:10

## 2021-10-13 RX ADMIN — SUCCINYLCHOLINE CHLORIDE 120 MG: 20 INJECTION, SOLUTION INTRAMUSCULAR; INTRAVENOUS; PARENTERAL at 09:10

## 2021-10-13 RX ADMIN — ROCURONIUM BROMIDE 10 MG: 10 INJECTION, SOLUTION INTRAVENOUS at 09:10

## 2021-10-13 RX ADMIN — CEFAZOLIN SODIUM 2 G: 2 SOLUTION INTRAVENOUS at 09:10

## 2021-10-13 RX ADMIN — SODIUM CHLORIDE, SODIUM GLUCONATE, SODIUM ACETATE, POTASSIUM CHLORIDE, MAGNESIUM CHLORIDE, SODIUM PHOSPHATE, DIBASIC, AND POTASSIUM PHOSPHATE: .53; .5; .37; .037; .03; .012; .00082 INJECTION, SOLUTION INTRAVENOUS at 08:10

## 2021-10-13 RX ADMIN — PROPOFOL 180 MG: 10 INJECTION, EMULSION INTRAVENOUS at 09:10

## 2021-10-13 RX ADMIN — ONDANSETRON 4 MG: 2 INJECTION, SOLUTION INTRAMUSCULAR; INTRAVENOUS at 09:10

## 2021-10-13 RX ADMIN — FENTANYL CITRATE 25 MCG: 50 INJECTION INTRAMUSCULAR; INTRAVENOUS at 11:10

## 2021-10-13 RX ADMIN — KETOROLAC TROMETHAMINE 30 MG: 30 INJECTION, SOLUTION INTRAMUSCULAR; INTRAVENOUS at 10:10

## 2021-10-13 RX ADMIN — FENTANYL CITRATE 100 MCG: 50 INJECTION, SOLUTION INTRAMUSCULAR; INTRAVENOUS at 09:10

## 2021-10-14 VITALS
OXYGEN SATURATION: 98 % | HEIGHT: 65 IN | DIASTOLIC BLOOD PRESSURE: 86 MMHG | RESPIRATION RATE: 18 BRPM | BODY MASS INDEX: 29.99 KG/M2 | WEIGHT: 180 LBS | TEMPERATURE: 98 F | SYSTOLIC BLOOD PRESSURE: 140 MMHG | HEART RATE: 52 BPM

## 2021-10-14 LAB
FINAL PATHOLOGIC DIAGNOSIS: NORMAL
GROSS: NORMAL
Lab: NORMAL
MICROSCOPIC EXAM: NORMAL

## 2021-12-15 ENCOUNTER — HOSPITAL ENCOUNTER (OUTPATIENT)
Dept: RADIOLOGY | Facility: HOSPITAL | Age: 41
Discharge: HOME | End: 2021-12-15
Attending: PAIN MEDICINE
Payer: MEDICARE

## 2021-12-15 DIAGNOSIS — M96.1 POSTLAMINECTOMY SYNDROME, NOT ELSEWHERE CLASSIFIED: ICD-10-CM

## 2021-12-15 LAB
CREAT SERPL-MCNC: 1 MG/DL (ref 0.5–1.4)
SAMPLE: NORMAL

## 2021-12-15 PROCEDURE — 25500020 PHARM REV CODE 255: Mod: PO | Performed by: PAIN MEDICINE

## 2021-12-15 PROCEDURE — A9585 GADOBUTROL INJECTION: HCPCS | Mod: PO | Performed by: PAIN MEDICINE

## 2021-12-15 PROCEDURE — 72158 MRI LUMBAR SPINE W/O & W/DYE: CPT | Mod: TC,PO

## 2021-12-15 RX ORDER — GADOBUTROL 604.72 MG/ML
8.5 INJECTION INTRAVENOUS
Status: COMPLETED | OUTPATIENT
Start: 2021-12-15 | End: 2021-12-15

## 2021-12-15 RX ADMIN — GADOBUTROL 8.5 ML: 604.72 INJECTION INTRAVENOUS at 04:12

## 2024-07-05 ENCOUNTER — OFFICE VISIT (OUTPATIENT)
Dept: URGENT CARE | Facility: CLINIC | Age: 44
End: 2024-07-05
Payer: MEDICARE

## 2024-07-05 VITALS
BODY MASS INDEX: 29.99 KG/M2 | HEART RATE: 60 BPM | SYSTOLIC BLOOD PRESSURE: 120 MMHG | TEMPERATURE: 97 F | OXYGEN SATURATION: 98 % | RESPIRATION RATE: 16 BRPM | HEIGHT: 65 IN | DIASTOLIC BLOOD PRESSURE: 78 MMHG | WEIGHT: 180 LBS

## 2024-07-05 DIAGNOSIS — H60.331 ACUTE SWIMMER'S EAR OF RIGHT SIDE: ICD-10-CM

## 2024-07-05 DIAGNOSIS — H66.91 ACUTE OTITIS MEDIA, RIGHT: Primary | ICD-10-CM

## 2024-07-05 RX ORDER — AMOXICILLIN AND CLAVULANATE POTASSIUM 875; 125 MG/1; MG/1
1 TABLET, FILM COATED ORAL EVERY 12 HOURS
Qty: 20 TABLET | Refills: 0 | Status: SHIPPED | OUTPATIENT
Start: 2024-07-05 | End: 2024-07-15

## 2024-07-05 RX ORDER — OFLOXACIN 3 MG/ML
5 SOLUTION AURICULAR (OTIC) 2 TIMES DAILY
Qty: 10 ML | Refills: 0 | Status: SHIPPED | OUTPATIENT
Start: 2024-07-05 | End: 2024-07-12

## 2024-07-05 NOTE — PROGRESS NOTES
"Subjective:      Patient ID: Darryn Wilson is a 43 y.o. male.    Vitals:  height is 5' 5" (1.651 m) and weight is 81.6 kg (180 lb). His oral temperature is 97.3 °F (36.3 °C). His blood pressure is 120/78 and his pulse is 60. His respiration is 16 and oxygen saturation is 98%.     Chief Complaint: Ear Fullness    Patient is a 43-year-old male with a past medical history of DDD, depression, hypertension, anemia, hypogonadism, and GERD who presents to clinic for evaluation of possible ear infection.  Patient reports he believes he may have swimmer's ear.  Patient reports symptoms began yesterday after he was swimming.  Patient reports no trauma or injury to the ear.  Patient reports symptoms to the right ear.  Patient reports has experienced inner and outer ear pain.  Patient reports has has some decreased hearing from right ear as well.  Patient states has not had any tinnitus or drainage from ear.  Patient denies any symptoms left ear.  Patient denies any headaches or dizziness, fever or chills.  Patient reports no over-the-counter medications for symptoms at this point.    Ear Fullness   There is pain in the right ear. This is a new problem. The current episode started yesterday. Associated symptoms include hearing loss (Right ear). Pertinent negatives include no abdominal pain, coughing, diarrhea, ear discharge, headaches, rash, sore throat or vomiting. He has tried nothing for the symptoms.       Constitution: Negative. Negative for chills, sweating, fatigue and fever.   HENT:  Positive for ear pain (Right ear) and hearing loss (Right ear). Negative for ear discharge, tinnitus, congestion and sore throat.    Neck: neck negative.   Cardiovascular: Negative.  Negative for chest pain and palpitations.   Eyes: Negative.    Respiratory: Negative.  Negative for chest tightness, cough and shortness of breath.    Gastrointestinal: Negative.  Negative for abdominal pain, nausea, vomiting and diarrhea.   Endocrine: " negative.   Genitourinary: Negative.    Musculoskeletal: Negative.  Negative for muscle ache.   Skin: Negative.  Negative for color change, pale, rash and erythema.   Allergic/Immunologic: Negative.    Neurological: Negative.  Negative for dizziness, light-headedness, passing out, headaches, disorientation and altered mental status.   Hematologic/Lymphatic: Negative.    Psychiatric/Behavioral: Negative.  Negative for altered mental status, disorientation and confusion.       Objective:     Physical Exam   Constitutional: He is oriented to person, place, and time. He appears well-developed. He is cooperative.  Non-toxic appearance. He does not appear ill. No distress.   HENT:   Head: Normocephalic and atraumatic.   Ears:   Right Ear: There is tenderness. No no drainage or swelling. No foreign bodies. Tympanic membrane is erythematous and bulging. Tympanic membrane is not perforated. Decreased hearing is noted.   Left Ear: Hearing, tympanic membrane, external ear and ear canal normal.   Nose: Nose normal. No mucosal edema, rhinorrhea, nasal deformity or congestion. No epistaxis. Right sinus exhibits no maxillary sinus tenderness and no frontal sinus tenderness. Left sinus exhibits no maxillary sinus tenderness and no frontal sinus tenderness.   Mouth/Throat: Uvula is midline, oropharynx is clear and moist and mucous membranes are normal. Mucous membranes are moist. No trismus in the jaw. Normal dentition. No uvula swelling. No oropharyngeal exudate or posterior oropharyngeal erythema. Oropharynx is clear.   Eyes: Conjunctivae and lids are normal. Pupils are equal, round, and reactive to light. Right eye exhibits no discharge. Left eye exhibits no discharge. No scleral icterus.   Neck: Trachea normal and phonation normal. Neck supple. No neck rigidity present.   Cardiovascular: Normal rate, regular rhythm, normal heart sounds and normal pulses.   Pulmonary/Chest: Effort normal and breath sounds normal. No respiratory  distress. He has no wheezes. He has no rhonchi. He has no rales.   Abdominal: Normal appearance and bowel sounds are normal. He exhibits no distension. Soft. There is no abdominal tenderness.   Musculoskeletal: Normal range of motion.         General: Normal range of motion.      Cervical back: He exhibits no tenderness.   Lymphadenopathy:     He has no cervical adenopathy.   Neurological: He is alert and oriented to person, place, and time. He exhibits normal muscle tone.   Skin: Skin is warm, dry, intact, not diaphoretic, not pale and no rash. Capillary refill takes less than 2 seconds. No erythema   Psychiatric: His speech is normal and behavior is normal. Judgment and thought content normal.   Nursing note and vitals reviewed.      Assessment:     1. Acute otitis media, right    2. Acute swimmer's ear of right side        Plan:       Acute otitis media, right    Acute swimmer's ear of right side    Other orders  -     amoxicillin-clavulanate 875-125mg (AUGMENTIN) 875-125 mg per tablet; Take 1 tablet by mouth every 12 (twelve) hours. for 10 days  Dispense: 20 tablet; Refill: 0  -     ofloxacin (FLOXIN) 0.3 % otic solution; Place 5 drops into the right ear 2 (two) times daily. for 7 days  Dispense: 10 mL; Refill: 0                Take medications as prescribed.    Water precautions.    Tylenol/Motrin per package instructions for any pain or fever.    Follow-up with PCP in 1-2 days.    Return to clinic as needed.    Follow-up ENT if symptoms still present with can 1-2 weeks.    To ED for any new or acutely worsening symptoms.    Patient in agreement with plan of care.    DISCLAIMER: Please note that my documentation in this Electronic Healthcare Record was produced using speech recognition software and therefore may contain errors related to that software system.These could include grammar, punctuation and spelling errors or the inclusion/exclusion of phrases that were not intended. Garbled syntax, mangled  pronouns, and other bizarre constructions may be attributed to that software system.

## 2024-07-19 ENCOUNTER — OFFICE VISIT (OUTPATIENT)
Dept: URGENT CARE | Facility: CLINIC | Age: 44
End: 2024-07-19
Payer: MEDICARE

## 2024-07-19 VITALS
BODY MASS INDEX: 29.99 KG/M2 | HEART RATE: 81 BPM | HEIGHT: 65 IN | TEMPERATURE: 101 F | OXYGEN SATURATION: 97 % | WEIGHT: 180 LBS | SYSTOLIC BLOOD PRESSURE: 141 MMHG | RESPIRATION RATE: 19 BRPM | DIASTOLIC BLOOD PRESSURE: 72 MMHG

## 2024-07-19 DIAGNOSIS — R50.9 FEVER, UNSPECIFIED FEVER CAUSE: ICD-10-CM

## 2024-07-19 DIAGNOSIS — U07.1 POSITIVE SELF-ADMINISTERED ANTIGEN TEST FOR COVID-19: ICD-10-CM

## 2024-07-19 DIAGNOSIS — J06.9 VIRAL URI: ICD-10-CM

## 2024-07-19 DIAGNOSIS — U07.1 COVID-19 VIRUS DETECTED: ICD-10-CM

## 2024-07-19 DIAGNOSIS — U07.1 COVID-19 VIRUS INFECTION: Primary | ICD-10-CM

## 2024-07-19 LAB
CTP QC/QA: YES
SARS-COV-2 AG RESP QL IA.RAPID: POSITIVE

## 2024-07-19 PROCEDURE — 99213 OFFICE O/P EST LOW 20 MIN: CPT | Mod: S$GLB,,, | Performed by: NURSE PRACTITIONER

## 2024-07-19 PROCEDURE — 87811 SARS-COV-2 COVID19 W/OPTIC: CPT | Mod: QW,S$GLB,, | Performed by: NURSE PRACTITIONER

## 2024-07-19 RX ORDER — AZELASTINE 1 MG/ML
1 SPRAY, METERED NASAL 2 TIMES DAILY PRN
Qty: 30 ML | Refills: 0 | Status: SHIPPED | OUTPATIENT
Start: 2024-07-19 | End: 2025-07-19

## 2024-07-19 RX ORDER — GUAIFENESIN 200 MG/1
400 TABLET ORAL EVERY 4 HOURS PRN
Qty: 30 TABLET | Refills: 0 | Status: SHIPPED | OUTPATIENT
Start: 2024-07-19 | End: 2024-07-26

## 2024-07-19 RX ORDER — BENZONATATE 100 MG/1
100 CAPSULE ORAL 3 TIMES DAILY PRN
Qty: 21 CAPSULE | Refills: 0 | Status: SHIPPED | OUTPATIENT
Start: 2024-07-19 | End: 2024-07-26

## 2024-07-19 RX ORDER — ACETAMINOPHEN 500 MG
1000 TABLET ORAL
Status: COMPLETED | OUTPATIENT
Start: 2024-07-19 | End: 2024-07-19

## 2024-07-19 RX ADMIN — Medication 1000 MG: at 11:07

## 2024-07-19 NOTE — PROGRESS NOTES
"Subjective:      Patient ID: Darryn Wilson is a 43 y.o. male.    Vitals:  height is 5' 5" (1.651 m) and weight is 81.6 kg (180 lb). His oral temperature is 100.7 °F (38.2 °C) (abnormal). His blood pressure is 141/72 (abnormal) and his pulse is 81. His respiration is 19 and oxygen saturation is 97%.     Chief Complaint: URI    43-year-old male seen today for sinus congestion, body aches, and fever.  He states symptoms began yesterday and today he developed sore throat.  There has been very little coughing.    URI   This is a new problem. The current episode started today. The problem has been gradually worsening. The maximum temperature recorded prior to his arrival was 100.4 - 100.9 F. Associated symptoms include congestion, headaches, sinus pain and a sore throat. Pertinent negatives include no chest pain, coughing, ear pain, nausea, rash or vomiting.       Constitution: Positive for chills and fever.   HENT:  Positive for congestion, sinus pain and sore throat. Negative for ear pain.    Cardiovascular:  Negative for chest pain, palpitations and sob on exertion.   Respiratory:  Negative for cough and shortness of breath.    Gastrointestinal:  Negative for nausea and vomiting.   Musculoskeletal:  Positive for muscle ache.   Skin:  Negative for rash.   Neurological:  Positive for headaches. Negative for dizziness, light-headedness, passing out, disorientation and altered mental status.   Psychiatric/Behavioral:  Negative for altered mental status, disorientation and confusion.       Objective:     Physical Exam   Constitutional: He is oriented to person, place, and time. He appears well-developed. He is cooperative.  Non-toxic appearance. He does not appear ill. No distress.   HENT:   Head: Normocephalic and atraumatic.   Ears:   Right Ear: Hearing and external ear normal.   Left Ear: Hearing and external ear normal.   Nose: Rhinorrhea and congestion present. No mucosal edema or nasal deformity. No epistaxis. " Right sinus exhibits no maxillary sinus tenderness and no frontal sinus tenderness. Left sinus exhibits no maxillary sinus tenderness and no frontal sinus tenderness.   Mouth/Throat: Uvula is midline, oropharynx is clear and moist and mucous membranes are normal. Mucous membranes are moist. No trismus in the jaw. Normal dentition. No uvula swelling. No oropharyngeal exudate, posterior oropharyngeal edema, posterior oropharyngeal erythema, tonsillar abscesses or cobblestoning.   Eyes: Conjunctivae and lids are normal. No scleral icterus.   Neck: Trachea normal and phonation normal. Neck supple. No edema present. No erythema present. No neck rigidity present.   Cardiovascular: Normal rate, regular rhythm, normal heart sounds and normal pulses.   Pulmonary/Chest: Effort normal and breath sounds normal. No stridor. No respiratory distress. He has no decreased breath sounds. He has no rhonchi.   Abdominal: Normal appearance.   Musculoskeletal: Normal range of motion.         General: No deformity. Normal range of motion.   Lymphadenopathy:     He has no cervical adenopathy.   Neurological: no focal deficit. He is alert and oriented to person, place, and time. He exhibits normal muscle tone. Coordination normal.   Skin: Skin is warm, dry, intact, not diaphoretic and not pale. Capillary refill takes 2 to 3 seconds.   Psychiatric: His speech is normal and behavior is normal. Judgment and thought content normal.   Nursing note and vitals reviewed.      Assessment:     1. COVID-19 virus infection    2. Viral URI    3. Positive self-administered antigen test for COVID-19    4. Fever, unspecified fever cause        Plan:       COVID-19 virus infection  -     azelastine (ASTELIN) 137 mcg (0.1 %) nasal spray; 1 spray (137 mcg total) by Nasal route 2 (two) times daily as needed for Rhinitis.  Dispense: 30 mL; Refill: 0  -     guaiFENesin 200 mg tablet; Take 2 tablets (400 mg total) by mouth every 4 (four) hours as needed for  Congestion.  Dispense: 30 tablet; Refill: 0  -     benzocaine-menthoL 6-10 mg lozenge; Take 1 lozenge by mouth every 2 (two) hours as needed (Sore Throat).  Dispense: 18 tablet; Refill: 0  -     benzonatate (TESSALON) 100 MG capsule; Take 1 capsule (100 mg total) by mouth 3 (three) times daily as needed for Cough.  Dispense: 21 capsule; Refill: 0    Viral URI  -     SARS Coronavirus 2 Antigen, POCT Manual Read    Positive self-administered antigen test for COVID-19  -     SARS Coronavirus 2 Antigen, POCT Manual Read    Fever, unspecified fever cause  -     acetaminophen tablet 1,000 mg      The test results and physical exam findings were discussed with the patient and all questions answered. We discussed symptom monitoring, conservative care methods, medication use, and follow up orders. he verbalized understanding and agreement with the plan of care.

## (undated) DEVICE — SCRUB 10% POVIDONE IODINE 4OZ

## (undated) DEVICE — STRAP OR TABLE 5IN X 72IN

## (undated) DEVICE — TRAY DRY SPONGE SCRUB W FOAM

## (undated) DEVICE — WATER STERILE INJ 500ML BAG

## (undated) DEVICE — BRIEF MESH LARGE

## (undated) DEVICE — GLOVE SURG ULTRA TOUCH 6

## (undated) DEVICE — SPONGE GAUZE 16PLY 4X4

## (undated) DEVICE — TOWEL OR DISP STRL BLUE 4/PK

## (undated) DEVICE — SEE MEDLINE ITEM 157181

## (undated) DEVICE — SUT CHROMIC 3-0 SH 27IN GUT

## (undated) DEVICE — SET CYSTO IRRIGATION UNIV SPIK

## (undated) DEVICE — ELECTRODE REM PLYHSV RETURN 9

## (undated) DEVICE — DRESSING DERMACEA SPNG 10S

## (undated) DEVICE — JELLY SURGILUBE LUBE TUBE 2OZ

## (undated) DEVICE — ADHESIVE DERMABOND ADVANCED

## (undated) DEVICE — GLOVE SURG ULTRA TOUCH 7

## (undated) DEVICE — SEE MEDLINE ITEM 157116

## (undated) DEVICE — SEE MEDLINE ITEM 157117

## (undated) DEVICE — SYR 10CC LUER LOCK

## (undated) DEVICE — PACK CUSTOM UNIV BASIN SLI

## (undated) DEVICE — SEE MEDLINE ITEM 146292

## (undated) DEVICE — SLEEVE SCD EXPRESS KNEE MEDIUM

## (undated) DEVICE — DRAPE MINOR PROCEDURE

## (undated) DEVICE — SOL 9P NACL IRR PIC IL

## (undated) DEVICE — TAPE SILK 3IN

## (undated) DEVICE — GLOVE SURG ULTRA TOUCH 7.5

## (undated) DEVICE — SPONGE SUPER KERLIX 6X6.75IN

## (undated) DEVICE — TIP YANKAUERS BULB NO VENT

## (undated) DEVICE — NDL SAFETY 21G X 1 1/2 ECLPSE

## (undated) DEVICE — LINER SUCTION 3000CC